# Patient Record
Sex: FEMALE | Race: WHITE | NOT HISPANIC OR LATINO | Employment: FULL TIME | URBAN - NONMETROPOLITAN AREA
[De-identification: names, ages, dates, MRNs, and addresses within clinical notes are randomized per-mention and may not be internally consistent; named-entity substitution may affect disease eponyms.]

---

## 2021-03-17 ENCOUNTER — OFFICE VISIT (OUTPATIENT)
Dept: FAMILY MEDICINE CLINIC | Facility: CLINIC | Age: 26
End: 2021-03-17

## 2021-03-17 ENCOUNTER — LAB (OUTPATIENT)
Dept: LAB | Facility: HOSPITAL | Age: 26
End: 2021-03-17

## 2021-03-17 VITALS
HEIGHT: 66 IN | SYSTOLIC BLOOD PRESSURE: 132 MMHG | DIASTOLIC BLOOD PRESSURE: 82 MMHG | BODY MASS INDEX: 47.09 KG/M2 | WEIGHT: 293 LBS

## 2021-03-17 DIAGNOSIS — E66.01 MORBID (SEVERE) OBESITY DUE TO EXCESS CALORIES (HCC): Primary | ICD-10-CM

## 2021-03-17 DIAGNOSIS — E88.81 METABOLIC SYNDROME: ICD-10-CM

## 2021-03-17 DIAGNOSIS — Z83.3 FAMILY HISTORY OF DIABETES MELLITUS (DM): ICD-10-CM

## 2021-03-17 DIAGNOSIS — Z11.59 NEED FOR HEPATITIS C SCREENING TEST: ICD-10-CM

## 2021-03-17 DIAGNOSIS — J45.40 MODERATE PERSISTENT ASTHMA, UNSPECIFIED WHETHER COMPLICATED: ICD-10-CM

## 2021-03-17 DIAGNOSIS — L68.0 HIRSUTISM: ICD-10-CM

## 2021-03-17 LAB
ALBUMIN SERPL-MCNC: 4.6 G/DL (ref 3.5–5.2)
ALBUMIN/GLOB SERPL: 1.4 G/DL
ALP SERPL-CCNC: 73 U/L (ref 39–117)
ALT SERPL W P-5'-P-CCNC: 40 U/L (ref 1–33)
ANION GAP SERPL CALCULATED.3IONS-SCNC: 10.5 MMOL/L (ref 5–15)
AST SERPL-CCNC: 32 U/L (ref 1–32)
BILIRUB SERPL-MCNC: 0.6 MG/DL (ref 0–1.2)
BUN SERPL-MCNC: 8 MG/DL (ref 6–20)
BUN/CREAT SERPL: 11.1 (ref 7–25)
CALCIUM SPEC-SCNC: 9.6 MG/DL (ref 8.6–10.5)
CHLORIDE SERPL-SCNC: 101 MMOL/L (ref 98–107)
CHOLEST SERPL-MCNC: 122 MG/DL (ref 0–200)
CO2 SERPL-SCNC: 28.5 MMOL/L (ref 22–29)
CREAT SERPL-MCNC: 0.72 MG/DL (ref 0.57–1)
DEPRECATED RDW RBC AUTO: 38.6 FL (ref 37–54)
ERYTHROCYTE [DISTWIDTH] IN BLOOD BY AUTOMATED COUNT: 12.6 % (ref 12.3–15.4)
GFR SERPL CREATININE-BSD FRML MDRD: 100 ML/MIN/1.73
GLOBULIN UR ELPH-MCNC: 3.3 GM/DL
GLUCOSE SERPL-MCNC: 106 MG/DL (ref 65–99)
HBA1C MFR BLD: 5.44 % (ref 4.8–5.6)
HCT VFR BLD AUTO: 46 % (ref 34–46.6)
HCV AB SER DONR QL: NORMAL
HDLC SERPL-MCNC: 55 MG/DL (ref 40–60)
HGB BLD-MCNC: 15.6 G/DL (ref 12–15.9)
LDLC SERPL CALC-MCNC: 49 MG/DL (ref 0–100)
LDLC/HDLC SERPL: 0.87 {RATIO}
MCH RBC QN AUTO: 28.8 PG (ref 26.6–33)
MCHC RBC AUTO-ENTMCNC: 33.9 G/DL (ref 31.5–35.7)
MCV RBC AUTO: 85 FL (ref 79–97)
PLATELET # BLD AUTO: 336 10*3/MM3 (ref 140–450)
PMV BLD AUTO: 11.2 FL (ref 6–12)
POTASSIUM SERPL-SCNC: 4.3 MMOL/L (ref 3.5–5.2)
PROT SERPL-MCNC: 7.9 G/DL (ref 6–8.5)
RBC # BLD AUTO: 5.41 10*6/MM3 (ref 3.77–5.28)
SODIUM SERPL-SCNC: 140 MMOL/L (ref 136–145)
T4 FREE SERPL-MCNC: 1.33 NG/DL (ref 0.93–1.7)
TRIGL SERPL-MCNC: 96 MG/DL (ref 0–150)
TSH SERPL DL<=0.05 MIU/L-ACNC: 1.25 UIU/ML (ref 0.27–4.2)
VLDLC SERPL-MCNC: 18 MG/DL (ref 5–40)
WBC # BLD AUTO: 9.29 10*3/MM3 (ref 3.4–10.8)

## 2021-03-17 PROCEDURE — 83036 HEMOGLOBIN GLYCOSYLATED A1C: CPT | Performed by: FAMILY MEDICINE

## 2021-03-17 PROCEDURE — 84439 ASSAY OF FREE THYROXINE: CPT | Performed by: FAMILY MEDICINE

## 2021-03-17 PROCEDURE — 36415 COLL VENOUS BLD VENIPUNCTURE: CPT | Performed by: FAMILY MEDICINE

## 2021-03-17 PROCEDURE — 80053 COMPREHEN METABOLIC PANEL: CPT | Performed by: FAMILY MEDICINE

## 2021-03-17 PROCEDURE — 85027 COMPLETE CBC AUTOMATED: CPT | Performed by: FAMILY MEDICINE

## 2021-03-17 PROCEDURE — 80061 LIPID PANEL: CPT | Performed by: FAMILY MEDICINE

## 2021-03-17 PROCEDURE — 99203 OFFICE O/P NEW LOW 30 MIN: CPT | Performed by: FAMILY MEDICINE

## 2021-03-17 PROCEDURE — 86803 HEPATITIS C AB TEST: CPT | Performed by: FAMILY MEDICINE

## 2021-03-17 PROCEDURE — 84402 ASSAY OF FREE TESTOSTERONE: CPT | Performed by: FAMILY MEDICINE

## 2021-03-17 PROCEDURE — 84403 ASSAY OF TOTAL TESTOSTERONE: CPT | Performed by: FAMILY MEDICINE

## 2021-03-17 PROCEDURE — 84443 ASSAY THYROID STIM HORMONE: CPT | Performed by: FAMILY MEDICINE

## 2021-03-17 RX ORDER — ALBUTEROL SULFATE 90 UG/1
2 AEROSOL, METERED RESPIRATORY (INHALATION) EVERY 4 HOURS PRN
Qty: 18 G | Refills: 11 | Status: SHIPPED | OUTPATIENT
Start: 2021-03-17 | End: 2022-01-03 | Stop reason: SDUPTHER

## 2021-03-17 NOTE — PROGRESS NOTES
Subjective   Justiceradha Rey is a 26 y.o. female.  Requesting primary care evaluation.  Carries diagnoses of persistent mild asthma is had for many years including childhood.  Takes long-acting and the rescue inhaler.  Does not use rescue that often.  Has not had influenza vaccine or Covid vaccine.  Also is concerned about weight.  Also had some hirsutism around the face probable has a metabolic syndrome as well as probable PCO.  Did visit with gynecology last year.  Some family history of diabetes.  Is not known to snore.  History and sidebar regenerated.    History of Present Illness   HPI    The following portions of the patient's history were reviewed and updated as appropriate: allergies, current medications, past family history, past medical history, past social history, past surgical history and problem list.    Review of Systems  Review of Systems   Constitutional: Negative for activity change, appetite change, fatigue and unexpected weight change.   HENT: Negative for trouble swallowing and voice change.    Eyes: Negative for redness and visual disturbance.   Respiratory: Positive for shortness of breath (Treated). Negative for cough and wheezing.    Cardiovascular: Negative for chest pain and palpitations.   Gastrointestinal: Negative for abdominal pain, constipation, diarrhea, nausea and vomiting.   Genitourinary: Negative for urgency.   Musculoskeletal: Negative for joint swelling.   Neurological: Negative for syncope and headaches.   Hematological: Negative for adenopathy.   Psychiatric/Behavioral: Negative for sleep disturbance.       Objective   Physical Exam  Physical Exam  Constitutional:       Appearance: She is well-developed.   HENT:      Head: Normocephalic.   Eyes:      Pupils: Pupils are equal, round, and reactive to light.   Neck:      Thyroid: No thyromegaly.   Cardiovascular:      Rate and Rhythm: Normal rate and regular rhythm.      Heart sounds: Normal heart sounds. No murmur heard.   No  "friction rub. No gallop.    Pulmonary:      Breath sounds: Normal breath sounds.   Abdominal:      General: There is no distension.      Palpations: Abdomen is soft. There is no mass.      Tenderness: There is no abdominal tenderness.   Musculoskeletal:         General: Normal range of motion.      Cervical back: Normal range of motion.   Skin:     General: Skin is warm and dry.      Comments: Mild terminal hair hirsutism under the chin and jawline.  No other terminal hair growth.   Neurological:      Mental Status: She is alert and oriented to person, place, and time.      Deep Tendon Reflexes: Reflexes are normal and symmetric.   Psychiatric:         Attention and Perception: Attention normal.         Mood and Affect: Mood normal.         Speech: Speech normal.         Behavior: Behavior normal.         Thought Content: Thought content normal.         Cognition and Memory: Cognition normal.           Visit Vitals  /82   Ht 167.6 cm (66\")   Wt (!) 143 kg (316 lb)   LMP 02/22/2021 (Exact Date)   BMI 51.00 kg/m²     Body mass index is 51 kg/m².      Assessment/Plan   Diagnoses and all orders for this visit:    1. Morbid (severe) obesity due to excess calories (CMS/HCC) (Primary)  -     CBC (No Diff)  -     Comprehensive Metabolic Panel  -     Hemoglobin A1c  -     T4, Free  -     TSH    2. Hirsutism  -     T4, Free  -     TSH  -     Testosterone (Free & Total), LC / MS    3. Family history of diabetes mellitus (DM)  -     Hemoglobin A1c    4. Moderate persistent asthma, unspecified whether complicated  -     albuterol sulfate  (90 Base) MCG/ACT inhaler; Inhale 2 puffs Every 4 (Four) Hours As Needed for Wheezing or Shortness of Air.  Dispense: 18 g; Refill: 11  -     fluticasone-salmeterol (ADVAIR) 100-50 MCG/DOSE DISKUS; Inhale 1 puff 2 (Two) Times a Day.  Dispense: 120 each; Refill: 3    5. Need for hepatitis C screening test  -     Hepatitis C Antibody    6. Metabolic syndrome  -     Hemoglobin A1c  -  "    T4, Free  -     TSH  -     Lipid Panel With LDL / HDL Ratio    Willie asthma counseled on updating immunizations yearly influenza Covid vaccine signed up counseled on use of medication properly avoidance measures etc.   need for metabolic screening.  Suspect does have an element of same.  Orders as above.  We will follow-up based on same to see what else needs to be done.  Check 5 to 7 days.

## 2021-03-23 LAB
TESTOST FREE SERPL-MCNC: 4.9 PG/ML (ref 0–4.2)
TESTOST SERPL-MCNC: 68 NG/DL (ref 10–55)

## 2021-03-24 ENCOUNTER — OFFICE VISIT (OUTPATIENT)
Dept: FAMILY MEDICINE CLINIC | Facility: CLINIC | Age: 26
End: 2021-03-24

## 2021-03-24 VITALS
WEIGHT: 293 LBS | HEIGHT: 66 IN | DIASTOLIC BLOOD PRESSURE: 84 MMHG | SYSTOLIC BLOOD PRESSURE: 126 MMHG | BODY MASS INDEX: 47.09 KG/M2

## 2021-03-24 DIAGNOSIS — E66.01 MORBID (SEVERE) OBESITY DUE TO EXCESS CALORIES (HCC): Primary | Chronic | ICD-10-CM

## 2021-03-24 DIAGNOSIS — J45.40 MODERATE PERSISTENT ASTHMA, UNSPECIFIED WHETHER COMPLICATED: Chronic | ICD-10-CM

## 2021-03-24 DIAGNOSIS — E34.9 FEMALE HYPERTESTOSTERONEMIA: ICD-10-CM

## 2021-03-24 PROCEDURE — 99213 OFFICE O/P EST LOW 20 MIN: CPT | Performed by: FAMILY MEDICINE

## 2021-03-29 ENCOUNTER — IMMUNIZATION (OUTPATIENT)
Dept: VACCINE CLINIC | Facility: HOSPITAL | Age: 26
End: 2021-03-29

## 2021-03-29 PROCEDURE — 0001A: CPT | Performed by: NURSE PRACTITIONER

## 2021-03-29 PROCEDURE — 91300 HC SARSCOV02 VAC 30MCG/0.3ML IM: CPT | Performed by: NURSE PRACTITIONER

## 2021-04-01 ENCOUNTER — TELEPHONE (OUTPATIENT)
Dept: FAMILY MEDICINE CLINIC | Facility: CLINIC | Age: 26
End: 2021-04-01

## 2021-04-01 NOTE — TELEPHONE ENCOUNTER
Pt will be attending college in [place] and is asking for her referral to endocrinology be to a [facility] provider located in [place].    Call pt for questions or next steps.    [phone]

## 2021-04-19 ENCOUNTER — IMMUNIZATION (OUTPATIENT)
Dept: VACCINE CLINIC | Facility: HOSPITAL | Age: 26
End: 2021-04-19

## 2021-04-19 PROCEDURE — 91300 HC SARSCOV02 VAC 30MCG/0.3ML IM: CPT | Performed by: THORACIC SURGERY (CARDIOTHORACIC VASCULAR SURGERY)

## 2021-04-19 PROCEDURE — 0002A: CPT | Performed by: THORACIC SURGERY (CARDIOTHORACIC VASCULAR SURGERY)

## 2021-04-24 ENCOUNTER — HOSPITAL ENCOUNTER (EMERGENCY)
Facility: HOSPITAL | Age: 26
Discharge: HOME | End: 2021-04-24
Attending: STUDENT IN AN ORGANIZED HEALTH CARE EDUCATION/TRAINING PROGRAM | Admitting: EMERGENCY MEDICINE

## 2021-04-24 VITALS
BODY MASS INDEX: 47.09 KG/M2 | HEIGHT: 66 IN | WEIGHT: 293 LBS | DIASTOLIC BLOOD PRESSURE: 97 MMHG | TEMPERATURE: 98.2 F | OXYGEN SATURATION: 100 % | RESPIRATION RATE: 20 BRPM | HEART RATE: 66 BPM | SYSTOLIC BLOOD PRESSURE: 158 MMHG

## 2021-04-24 DIAGNOSIS — S01.81XA LACERATION OF FOREHEAD, INITIAL ENCOUNTER: Primary | ICD-10-CM

## 2021-04-24 PROCEDURE — 25010000002 TDAP 5-2.5-18.5 LF-MCG/0.5 SUSPENSION: Performed by: PHYSICIAN ASSISTANT

## 2021-04-24 PROCEDURE — 90471 IMMUNIZATION ADMIN: CPT | Performed by: PHYSICIAN ASSISTANT

## 2021-04-24 PROCEDURE — 90715 TDAP VACCINE 7 YRS/> IM: CPT | Performed by: PHYSICIAN ASSISTANT

## 2021-04-24 PROCEDURE — 99282 EMERGENCY DEPT VISIT SF MDM: CPT

## 2021-04-24 RX ORDER — LIDOCAINE HYDROCHLORIDE 10 MG/ML
10 INJECTION, SOLUTION EPIDURAL; INFILTRATION; INTRACAUDAL; PERINEURAL ONCE
Status: COMPLETED | OUTPATIENT
Start: 2021-04-24 | End: 2021-04-24

## 2021-04-24 RX ADMIN — LIDOCAINE HYDROCHLORIDE 10 ML: 10 INJECTION, SOLUTION EPIDURAL; INFILTRATION; INTRACAUDAL; PERINEURAL at 19:31

## 2021-04-24 RX ADMIN — TETANUS TOXOID, REDUCED DIPHTHERIA TOXOID AND ACELLULAR PERTUSSIS VACCINE, ADSORBED 0.5 ML: 5; 2.5; 8; 8; 2.5 SUSPENSION INTRAMUSCULAR at 19:29

## 2021-04-25 NOTE — ED PROVIDER NOTES
Subjective   Patient presents to emergency department for laceration to left eyebrow/forehead.  States she fell through some wood decking and hit her forehead on a metal chair.  She is unsure if she passed out but states it was <2sec and no Syncope was noted although it was witnessed by several bystanders.  She is unsure of tetanus status.  She denies any other injury.      History provided by:  Patient   used: No        Review of Systems   Constitutional: Negative for chills and fever.   HENT: Negative for sore throat and trouble swallowing.    Eyes: Negative for visual disturbance.   Respiratory: Negative for shortness of breath and wheezing.    Cardiovascular: Negative for chest pain.   Gastrointestinal: Negative for abdominal pain, nausea and vomiting.   Skin: Positive for wound.   Allergic/Immunologic: Negative for immunocompromised state.   Neurological: Negative for syncope, weakness, numbness and headaches.   Hematological: Does not bruise/bleed easily.   Psychiatric/Behavioral: Negative for confusion.       Past Medical History:   Diagnosis Date   • Allergic rhinitis    • Ankle pain    • Asthma     IgE-mediated allergic asthma      • Body mass index 40.0-44.9, adult (CMS/[facility])     BMI 42.5   • Bronchitis    • Chronic fatigue, unspecified    • Encounter for screening for diabetes mellitus    • Hematoma     is traumatic   • Insomnia, unspecified    • Pain in lower limb    • Rhinitis    • Ruptured varicose vein     RLE   • Sprain of ankle    • Upper respiratory infection        No Known Allergies    Past Surgical History:   Procedure Laterality Date   • INJECTION OF MEDICATION  05/01/2015    Depo Medrol (Methylprednisone) (2)      • INJECTION OF MEDICATION  01/01/2016    INFLUENZA IMMUN ADMIN OR PREV RECV'D  (1)      • INJECTION OF MEDICATION  06/28/2016    Kenalog (1)      • TONSILLECTOMY         Family History   Problem Relation Age of Onset   • Diabetes Other        Social History  "    Socioeconomic History   • Marital status: Single     Spouse name: Not on file   • Number of children: Not on file   • Years of education: Not on file   • Highest education level: Not on file   Tobacco Use   • Smoking status: Never Smoker   Substance and Sexual Activity   • Alcohol use: Yes     Comment: occasional   • Drug use: Never   • Sexual activity: Never           Objective      /97 (BP Location: Right arm, Patient Position: Sitting)   Pulse 66   Temp 98.2 °F (36.8 °C) (Infrared)   Resp 20   Ht 167.6 cm (66\")   Wt (!) 150 kg (330 lb 6.4 oz)   LMP  (LMP Unknown)   SpO2 100%   Breastfeeding No   BMI 53.33 kg/m²     Physical Exam  Vitals and nursing note reviewed.   Constitutional:       Appearance: Normal appearance. She is well-developed.   HENT:      Head:      Comments: 2 cm vertical laceration to medial left eyebrow/forehead.  No deformity or underlying crepitus.  Mild swelling around laceration.     Nose: Nose normal.      Mouth/Throat:      Mouth: Mucous membranes are moist.   Eyes:      Conjunctiva/sclera: Conjunctivae normal.   Cardiovascular:      Rate and Rhythm: Normal rate and regular rhythm.      Heart sounds: Normal heart sounds.   Pulmonary:      Effort: Pulmonary effort is normal. No respiratory distress.      Breath sounds: Normal breath sounds. No wheezing.   Skin:     General: Skin is warm and dry.      Capillary Refill: Capillary refill takes less than 2 seconds.   Neurological:      Mental Status: She is alert and oriented to person, place, and time.   Psychiatric:         Behavior: Behavior normal.         Thought Content: Thought content normal.         Laceration Repair    Date/Time: 4/24/2021 9:06 PM  Performed by: Yann Crawford PA-C  Authorized by: Dionte Peralta MD     Consent:     Consent obtained:  Verbal    Consent given by:  Patient    Risks discussed:  Infection, pain, retained foreign body, need for additional repair, poor cosmetic result, tendon " damage, vascular damage, poor wound healing and nerve damage    Alternatives discussed:  No treatment  Anesthesia (see MAR for exact dosages):     Anesthesia method:  Local infiltration    Local anesthetic:  Lidocaine 1% w/o epi  Laceration details:     Location:  Face    Face location:  L eyebrow    Length (cm):  2  Repair type:     Repair type:  Simple  Pre-procedure details:     Preparation:  Patient was prepped and draped in usual sterile fashion  Exploration:     Hemostasis achieved with:  Cautery    Wound exploration: wound explored through full range of motion and entire depth of wound probed and visualized      Wound extent: no foreign bodies/material noted and no underlying fracture noted      Contaminated: no    Treatment:     Area cleansed with:  Hibiclens and saline    Amount of cleaning:  Standard    Irrigation solution:  Sterile water  Skin repair:     Repair method:  Sutures    Suture size:  5-0    Suture material:  Nylon    Suture technique:  Simple interrupted    Number of sutures:  4  Approximation:     Approximation:  Close  Post-procedure details:     Dressing:  Open (no dressing)    Patient tolerance of procedure:  Tolerated well, no immediate complications               ED Course           Discussed results with patient.  Gave educational materials.  Advised close follow up with PCP in 1 week for wound check/suture removal.  Return to emergency department for new or worsening symptoms.                                    MDM    Final diagnoses:   Laceration of forehead, initial encounter       ED Disposition  ED Disposition     ED Disposition Condition Comment    Discharge Stable           Inder Ignacio MD  [facility] [address] [place] [address] [phone]    Call   for wound check/suture removal in 1 week.    [facility] Emergency Department  [address]  [facility] [address] [phone]  Go to   if symptoms worsen.         Medication List      No changes  were made to your prescriptions during this visit.          Yann Crawford PA-C  04/24/21 2992

## 2021-04-30 ENCOUNTER — OFFICE VISIT (OUTPATIENT)
Dept: FAMILY MEDICINE CLINIC | Facility: CLINIC | Age: 26
End: 2021-04-30

## 2021-04-30 VITALS
HEIGHT: 66 IN | BODY MASS INDEX: 47.09 KG/M2 | WEIGHT: 293 LBS | SYSTOLIC BLOOD PRESSURE: 132 MMHG | DIASTOLIC BLOOD PRESSURE: 82 MMHG

## 2021-04-30 DIAGNOSIS — S01.80XD OPEN WOUND OF FACE, SUBSEQUENT ENCOUNTER: Primary | ICD-10-CM

## 2021-04-30 DIAGNOSIS — Z48.02 VISIT FOR SUTURE REMOVAL: ICD-10-CM

## 2021-04-30 DIAGNOSIS — J45.40 MODERATE PERSISTENT ASTHMA, UNSPECIFIED WHETHER COMPLICATED: ICD-10-CM

## 2021-04-30 PROCEDURE — 99212 OFFICE O/P EST SF 10 MIN: CPT | Performed by: FAMILY MEDICINE

## 2021-04-30 NOTE — PROGRESS NOTES
Answers for HPI/ROS submitted by the patient on 4/27/2021  Please describe your symptoms.: Need to have follow up after facial laceration and stitches removed.  Have you had these symptoms before?: No  How long have you been having these symptoms?: 1-2 weeks  What is the primary reason for your visit?: Other    Subjective   Justice Jovita Rey is a 26 y.o. female.  Emergency room follow-up trauma laceration left upper medial brow abrasion to the nose.  Fell through a porch.  Now about 6 days out.  Stitches need removed left brow.  Still having some occasional nose discomfort overall improved.  Also requesting increase in Advair to the 500 mcg dose.  States works better for her asthma during summertime.    History of Present Illness   HPI    The following portions of the patient's history were reviewed and updated as appropriate: allergies, current medications, past family history, past medical history, past social history, past surgical history and problem list.    Review of Systems  Review of Systems   Constitutional: Negative for activity change, appetite change, fatigue and unexpected weight change.   HENT: Negative for trouble swallowing and voice change.    Eyes: Negative for redness and visual disturbance.   Respiratory: Positive for wheezing (Controlled asthma). Negative for cough.    Cardiovascular: Negative for chest pain and palpitations.   Gastrointestinal: Negative for abdominal pain, constipation, diarrhea, nausea and vomiting.   Genitourinary: Negative for urgency.   Musculoskeletal: Negative for joint swelling.   Skin: Positive for wound.   Neurological: Negative for syncope and headaches.   Hematological: Negative for adenopathy.   Psychiatric/Behavioral: Negative for sleep disturbance.       Objective   Physical Exam  Physical Exam  Constitutional:       Appearance: She is obese.   HENT:      Head: Normocephalic.   Cardiovascular:      Rate and Rhythm: Normal rate.      Pulses: Normal pulses.   Pulmonary:  "     Effort: Pulmonary effort is normal.   Abdominal:      General: Abdomen is flat.   Skin:     Comments: Left upper inner brow shows a vertical laceration about 2 cm or so.  4 of 6-0 nylon sutures are in place.  These are removed middle part of the brow may start to gap little bit this is closed using up Steri-Strips.  Counseled on wound care.           Visit Vitals  /82   Ht 167.6 cm (66\")   Wt (!) 147 kg (323 lb)   LMP  (LMP Unknown)   BMI 52.13 kg/m²     Body mass index is 52.13 kg/m².      Assessment/Plan   Diagnoses and all orders for this visit:    1. Open wound of face, subsequent encounter (Primary)    2. Moderate persistent asthma, unspecified whether complicated  -     fluticasone-salmeterol (Advair Diskus) 500-50 MCG/DOSE DISKUS; Inhale 1 puff 2 (Two) Times a Day.  Dispense: 180 each; Refill: 3    3. Visit for suture removal    Counseled on wound care letting Steri-Strips fall off naturally routine skin care routine nose care increase medication rechecks directed  "

## 2021-10-26 ENCOUNTER — LAB (OUTPATIENT)
Dept: LAB | Facility: HOSPITAL | Age: 26
End: 2021-10-26

## 2021-10-26 ENCOUNTER — OFFICE VISIT (OUTPATIENT)
Dept: ENDOCRINOLOGY | Facility: CLINIC | Age: 26
End: 2021-10-26

## 2021-10-26 ENCOUNTER — MEDICATION THERAPY MANAGEMENT (OUTPATIENT)
Dept: PHARMACY | Facility: HOSPITAL | Age: 26
End: 2021-10-26

## 2021-10-26 VITALS
BODY MASS INDEX: 47.09 KG/M2 | OXYGEN SATURATION: 98 % | HEART RATE: 109 BPM | SYSTOLIC BLOOD PRESSURE: 110 MMHG | DIASTOLIC BLOOD PRESSURE: 60 MMHG | WEIGHT: 293 LBS | HEIGHT: 66 IN

## 2021-10-26 DIAGNOSIS — E16.1 HYPERINSULINEMIA: ICD-10-CM

## 2021-10-26 DIAGNOSIS — E34.9 FEMALE HYPERTESTOSTERONEMIA: Primary | ICD-10-CM

## 2021-10-26 PROCEDURE — 99204 OFFICE O/P NEW MOD 45 MIN: CPT | Performed by: INTERNAL MEDICINE

## 2021-10-26 PROCEDURE — 82397 CHEMILUMINESCENT ASSAY: CPT | Performed by: INTERNAL MEDICINE

## 2021-10-26 PROCEDURE — 36415 COLL VENOUS BLD VENIPUNCTURE: CPT | Performed by: INTERNAL MEDICINE

## 2021-10-26 PROCEDURE — 85025 COMPLETE CBC W/AUTO DIFF WBC: CPT | Performed by: INTERNAL MEDICINE

## 2021-10-26 PROCEDURE — 83498 ASY HYDROXYPROGESTERONE 17-D: CPT | Performed by: INTERNAL MEDICINE

## 2021-10-26 PROCEDURE — 82627 DEHYDROEPIANDROSTERONE: CPT | Performed by: INTERNAL MEDICINE

## 2021-10-26 PROCEDURE — 84703 CHORIONIC GONADOTROPIN ASSAY: CPT | Performed by: INTERNAL MEDICINE

## 2021-10-26 PROCEDURE — 80053 COMPREHEN METABOLIC PANEL: CPT | Performed by: INTERNAL MEDICINE

## 2021-10-26 PROCEDURE — 84403 ASSAY OF TOTAL TESTOSTERONE: CPT | Performed by: INTERNAL MEDICINE

## 2021-10-26 RX ORDER — METFORMIN HYDROCHLORIDE 500 MG/1
1000 TABLET, EXTENDED RELEASE ORAL 2 TIMES DAILY WITH MEALS
Qty: 120 TABLET | Refills: 11 | Status: SHIPPED | OUTPATIENT
Start: 2021-10-26 | End: 2022-09-30

## 2021-10-26 RX ORDER — ONDANSETRON 4 MG/1
4 TABLET, ORALLY DISINTEGRATING ORAL EVERY 8 HOURS PRN
Qty: 45 TABLET | Refills: 11 | Status: SHIPPED | OUTPATIENT
Start: 2021-10-26

## 2021-10-26 RX ORDER — SEMAGLUTIDE 1.34 MG/ML
0.5 INJECTION, SOLUTION SUBCUTANEOUS WEEKLY
Qty: 1.5 ML | Refills: 11 | Status: SHIPPED | OUTPATIENT
Start: 2021-10-26 | End: 2022-04-07

## 2021-10-26 NOTE — PROGRESS NOTES
[facility] Pharmacy Visit - Endocrinology    Justice Rey is a 26 y.o. female seen by Endocrinology and assessed by the [facility] Pharmacist.     Justice Rey was introduced to the [facility] and allergies/PMH/medications were reviewed.       Past Medical History:   Diagnosis Date   • Allergic rhinitis    • Ankle pain    • Asthma     IgE-mediated allergic asthma      • Body mass index 40.0-44.9, adult (HCC)     BMI 42.5   • Bronchitis    • Chronic fatigue, unspecified    • Encounter for screening for diabetes mellitus    • Hematoma     is traumatic   • Insomnia, unspecified    • Pain in lower limb    • Rhinitis    • Ruptured varicose vein     RLE   • Sprain of ankle    • Upper respiratory infection      Social History     Socioeconomic History   • Marital status: Single   Tobacco Use   • Smoking status: Current Some Day Smoker   • Smokeless tobacco: Never Used   Vaping Use   • Vaping Use: Never used   Substance and Sexual Activity   • Alcohol use: Yes     Comment: occasional   • Drug use: Never   • Sexual activity: Never       Medication Allergies:  Patient has no known allergies.      Current Outpatient Medications:   •  albuterol sulfate  (90 Base) MCG/ACT inhaler, Inhale 2 puffs Every 4 (Four) Hours As Needed for Wheezing or Shortness of Air., Disp: 18 g, Rfl: 11  •  fluticasone-salmeterol (Advair Diskus) 500-50 MCG/DOSE DISKUS, Inhale 1 puff 2 (Two) Times a Day., Disp: 180 each, Rfl: 3  •  metFORMIN ER (Glucophage XR) 500 MG 24 hr tablet, Take 2 tablets by mouth 2 (Two) Times a Day With Meals., Disp: 120 tablet, Rfl: 11  •  ondansetron ODT (ZOFRAN-ODT) 4 MG disintegrating tablet, Dissolve 1 tablet on the tongue Every 8 (Eight) Hours As Needed for Nausea or Vomiting., Disp: 45 tablet, Rfl: 11  •  Semaglutide,0.25 or 0.5MG/DOS, (Ozempic, 0.25 or 0.5 MG/DOSE,) 2 MG/1.5ML solution pen-injector, Inject 0.5 mg under the skin into the appropriate area as directed  1 (One) Time Per Week., Disp: 1.5 mL, Rfl: 11    Labs:  There were no vitals filed for this visit.  There were no vitals filed for this visit.  Lab Results   Component Value Date    HGBA1C 5.44 03/17/2021     Lab Results   Component Value Date    GLUCOSE 106 (H) 03/17/2021    CALCIUM 9.6 03/17/2021     03/17/2021    K 4.3 03/17/2021    CO2 28.5 03/17/2021     03/17/2021    BUN 8 03/17/2021    CREATININE 0.72 03/17/2021    EGFRIFNONA 100 03/17/2021    BCR 11.1 03/17/2021    ANIONGAP 10.5 03/17/2021     Lab Results   Component Value Date    CHOL 122 03/17/2021    TRIG 96 03/17/2021    HDL 55 03/17/2021    LDL 49 03/17/2021       Visit Summary:  Patient is new to ozempic and metformin. Patient is from our area but lives in [place] for school right now. We will follow her refills closely to help coordinate.     Medication Education on Specialty Medication:  Ozempic (semaglutide)   • Discussed the medication's MOA and that it helps you feel full, helps your body release more insulin and helps your body make less sugar after meals.  • Administer by subcutaneous injection into the abdomen, thigh, or upper arm on the same day each week without regard to food. Rotate injection sites weekly if injecting in the same area of the body and use a new needle every time Ozempic is used. Do not mix with other products.   o For each new prefilled pen, prime the needle before the first injection by turning the dose selector to the flow check symbol and injecting into the air (priming is not required for subsequent injections). Once injected, continue to depress the button until the dial has returned to 0 and for an additional 6 seconds. Then, remove the needle and discard in sharps container.  • Unopened pens should be stored in refrigerator, opened pens may be stored in refrigerator or at room temperature for up to 56 days  • If you miss a dose, take it as soon as you remember and then get back on schedule  o If it has  been > 5 days, skip that dose and get back on your regular schedule allowing at least 48 hours between 2 doses.   o Never double up doses to make up for a missed dose  • Nausea, vomiting, and diarrhea are most common when first starting Ozempic, but they should decrease over time  • Make sure to eat smaller meals throughout the day versus larger meals and this should help with GI symptoms as well.        Plan:  1. The patient was provided medication education via verbal and written information. Patient expressed understanding and had no further questions at this time.  2. Recommendations: none at this time  3. Discussed the [facility] pharmacy services that are available including monitoring of refills, prior authorizations, and copay coupon cards, as applicable, as well as mail-order as needed.  4. Care Coordinator, Alexis Oneill, will assist with the transfer of prescriptions as applicable.  5. Contact information for the pharmacy team was provided to the patient.      Garfield Centeno, PharmYUSUF  10/26/2021  08:47 CDT

## 2021-10-26 NOTE — PROGRESS NOTES
"Chief Complaint   Patient presents with   • Hypertestosteronemia         History of Present Illness    26 y.o. female w elevated testosterone    Symptoms : Oligomenorrhea, Hirsutism, Weight gain     Location of Hirsutism : Chin , chest , abdomen     No acne, no balding, no voice deepening     Duration of symptoms : since age 24     Treatment - progestin only contraceptive         ==========================================  Physical Exam  /60   Pulse 109   Ht 167.6 cm (66\")   Wt (!) 150 kg (330 lb)   SpO2 98%   BMI 53.26 kg/m²   AOx3  No Goiter , no carotid bruit  RRR  CTA  No Edema     ==========================================    Laboratory Workup    Lab Results   Component Value Date    WBC 8.23 10/26/2021    HGB 15.4 10/26/2021    HCT 45.1 10/26/2021    MCV 87.1 10/26/2021     10/26/2021       Lab Results   Component Value Date    GLUCOSE 100 (H) 10/26/2021    BUN 8 10/26/2021    CREATININE 0.72 10/26/2021    EGFRIFNONA 100 10/26/2021    BCR 11.1 10/26/2021    K 4.5 10/26/2021    CO2 27.2 10/26/2021    CALCIUM 9.3 10/26/2021    ALBUMIN 4.60 10/26/2021    AST 21 10/26/2021    ALT 38 (H) 10/26/2021     Component      Latest Ref Rng & Units 10/26/2021   17-Hydroxyprogesterone      ng/dL 21   Anti-Mullerian Hormone (AMH)      ng/mL 2.14   Testosterone, Total      8.40 - 48.10 ng/dL 43.40   HCG Qualitative      Negative Negative   DHEA-Sulfate      110.0 - 431.7 ug/dL 203.0         ==========================================      ICD-10-CM ICD-9-CM   1. 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 Hyperinsulinemia  E16.1 251.1   -    Most consistent w PCOS    Ruled out CAH  Pending saliva cortisol     Start metformin and ozempic    She is on progesting only ocp and having a cycle at least every 3months  She will establish w gynecologist     No aldactone for now          Orders Placed This Encounter   Procedures   • 17-Hydroxyprogesterone     Order Specific Question:   Release to patient     Answer:   " Immediate   • Antimullerian Hormone (AMH)     Order Specific Question:   Release to patient     Answer:   Immediate   • Testosterone     Order Specific Question:   Release to patient     Answer:   Immediate   • hCG, Serum, Qualitative     Order Specific Question:   Release to patient     Answer:   Immediate   • Salivary Cortisol X4, Timed - Saliva, Oral Cavity     Scheduling Instructions:      Collect at 8 a.m. And 11 p.m. On 2 consecutive days     Order Specific Question:   Release to patient     Answer:   Immediate   • DHEA-Sulfate     Order Specific Question:   Release to patient     Answer:   Immediate   • Comprehensive Metabolic Panel     Order Specific Question:   Release to patient     Answer:   Immediate   • CBC Auto Differential   • CBC & Differential     Order Specific Question:   Manual Differential     Answer:   No                This document has been electronically signed by Jordi Edwards MD on November 5, 2021 18:16 CDT

## 2021-10-27 LAB
ALBUMIN SERPL-MCNC: 4.6 G/DL (ref 3.5–5.2)
ALBUMIN/GLOB SERPL: 1.4 G/DL
ALP SERPL-CCNC: 85 U/L (ref 39–117)
ALT SERPL W P-5'-P-CCNC: 38 U/L (ref 1–33)
ANION GAP SERPL CALCULATED.3IONS-SCNC: 9.8 MMOL/L (ref 5–15)
AST SERPL-CCNC: 21 U/L (ref 1–32)
BASOPHILS # BLD AUTO: 0.06 10*3/MM3 (ref 0–0.2)
BASOPHILS NFR BLD AUTO: 0.7 % (ref 0–1.5)
BILIRUB SERPL-MCNC: 0.6 MG/DL (ref 0–1.2)
BUN SERPL-MCNC: 8 MG/DL (ref 6–20)
BUN/CREAT SERPL: 11.1 (ref 7–25)
CALCIUM SPEC-SCNC: 9.3 MG/DL (ref 8.6–10.5)
CHLORIDE SERPL-SCNC: 101 MMOL/L (ref 98–107)
CO2 SERPL-SCNC: 27.2 MMOL/L (ref 22–29)
CREAT SERPL-MCNC: 0.72 MG/DL (ref 0.57–1)
DEPRECATED RDW RBC AUTO: 39.2 FL (ref 37–54)
DHEA-S SERPL-MCNC: 203 UG/DL (ref 110–431.7)
EOSINOPHIL # BLD AUTO: 0.18 10*3/MM3 (ref 0–0.4)
EOSINOPHIL NFR BLD AUTO: 2.2 % (ref 0.3–6.2)
ERYTHROCYTE [DISTWIDTH] IN BLOOD BY AUTOMATED COUNT: 12.4 % (ref 12.3–15.4)
GFR SERPL CREATININE-BSD FRML MDRD: 100 ML/MIN/1.73
GLOBULIN UR ELPH-MCNC: 3.2 GM/DL
GLUCOSE SERPL-MCNC: 100 MG/DL (ref 65–99)
HCG SERPL QL: NEGATIVE
HCT VFR BLD AUTO: 45.1 % (ref 34–46.6)
HGB BLD-MCNC: 15.4 G/DL (ref 12–15.9)
IMM GRANULOCYTES # BLD AUTO: 0.03 10*3/MM3 (ref 0–0.05)
IMM GRANULOCYTES NFR BLD AUTO: 0.4 % (ref 0–0.5)
LYMPHOCYTES # BLD AUTO: 2.12 10*3/MM3 (ref 0.7–3.1)
LYMPHOCYTES NFR BLD AUTO: 25.8 % (ref 19.6–45.3)
MCH RBC QN AUTO: 29.7 PG (ref 26.6–33)
MCHC RBC AUTO-ENTMCNC: 34.1 G/DL (ref 31.5–35.7)
MCV RBC AUTO: 87.1 FL (ref 79–97)
MONOCYTES # BLD AUTO: 0.6 10*3/MM3 (ref 0.1–0.9)
MONOCYTES NFR BLD AUTO: 7.3 % (ref 5–12)
NEUTROPHILS NFR BLD AUTO: 5.24 10*3/MM3 (ref 1.7–7)
NEUTROPHILS NFR BLD AUTO: 63.6 % (ref 42.7–76)
NRBC BLD AUTO-RTO: 0 /100 WBC (ref 0–0.2)
PLATELET # BLD AUTO: 338 10*3/MM3 (ref 140–450)
PMV BLD AUTO: 10.9 FL (ref 6–12)
POTASSIUM SERPL-SCNC: 4.5 MMOL/L (ref 3.5–5.2)
PROT SERPL-MCNC: 7.8 G/DL (ref 6–8.5)
RBC # BLD AUTO: 5.18 10*6/MM3 (ref 3.77–5.28)
SODIUM SERPL-SCNC: 138 MMOL/L (ref 136–145)
TESTOST SERPL-MCNC: 43.4 NG/DL (ref 8.4–48.1)
WBC # BLD AUTO: 8.23 10*3/MM3 (ref 3.4–10.8)

## 2021-10-30 ENCOUNTER — LAB (OUTPATIENT)
Dept: LAB | Facility: HOSPITAL | Age: 26
End: 2021-10-30

## 2021-10-30 PROCEDURE — 82533 TOTAL CORTISOL: CPT | Performed by: INTERNAL MEDICINE

## 2021-10-31 LAB — 17OHP SERPL-MCNC: 21 NG/DL

## 2021-11-01 LAB — MIS SERPL-MCNC: 2.14 NG/ML

## 2021-11-06 LAB
CORTIS BS SAL-MCNC: 0.02 UG/DL
CORTIS SP1 P CHAL SAL-MCNC: 0.2 UG/DL
CORTIS SP2 P CHAL SAL-MCNC: 0.1 UG/DL
CORTIS SP3 P CHAL SAL-MCNC: 0.4 UG/DL

## 2021-11-08 ENCOUNTER — SPECIALTY PHARMACY (OUTPATIENT)
Dept: PHARMACY | Facility: HOSPITAL | Age: 26
End: 2021-11-08

## 2021-11-08 RX ORDER — ACETAMINOPHEN AND CODEINE PHOSPHATE 120; 12 MG/5ML; MG/5ML
SOLUTION ORAL
COMMUNITY
Start: 2021-10-20 | End: 2021-12-30

## 2021-11-08 RX ORDER — LEVONORGESTREL 1.5 MG/1
TABLET ORAL
COMMUNITY
Start: 2021-08-27 | End: 2021-12-30

## 2021-11-29 ENCOUNTER — SPECIALTY PHARMACY (OUTPATIENT)
Dept: ENDOCRINOLOGY | Facility: CLINIC | Age: 26
End: 2021-11-29

## 2021-11-29 NOTE — PROGRESS NOTES
I have added a refill coordination for next month. She did express some nausea with the ozempic and I told her to please let me know if that continues.

## 2021-12-20 ENCOUNTER — SPECIALTY PHARMACY (OUTPATIENT)
Dept: ENDOCRINOLOGY | Facility: CLINIC | Age: 26
End: 2021-12-20

## 2021-12-20 NOTE — PROGRESS NOTES
Specialty Pharmacy Refill Coordination Note     Justice is a 26 y.o. female contacted today regarding refills of  Ozempic specialty medication(s).    Reviewed and verified with patient:  Allergies  Meds       Specialty medication(s) and dose(s) confirmed: yes    Refill Questions      Most Recent Value   Changes to allergies? No   Changes to medications? No   New conditions since last clinic visit No   Unplanned office visit, urgent care, ED, or hospital admission in the last 4 weeks  No   How does patient/caregiver feel medication is working? Very good   Financial problems or insurance changes  No   How many doses of your specialty medications were missed in the last 4 weeks? 0          Delivery Questions      Most Recent Value   Delivery method  at Pharmacy   Delivery phone number [phone]   Medication being filled and delivered Ozempic, metformin and possible zofran   Doses left of specialty medications 1 week   Is there any medication that is due not being filled? No   Supplies needed? No supplies needed   Copay form of payment Pay at pickup   Questions or concerns for the pharmacist? No   Are any medications first time fills? No        Ms. Rey is not having any issues, she will be in town for [place] and will  her RX next Monday.          Follow-up: 1 month     Alexis Oneill  Specialty Pharmacy Technician

## 2021-12-30 ENCOUNTER — OFFICE VISIT (OUTPATIENT)
Dept: OBSTETRICS AND GYNECOLOGY | Facility: CLINIC | Age: 26
End: 2021-12-30

## 2021-12-30 ENCOUNTER — LAB (OUTPATIENT)
Dept: LAB | Facility: HOSPITAL | Age: 26
End: 2021-12-30

## 2021-12-30 VITALS
SYSTOLIC BLOOD PRESSURE: 124 MMHG | WEIGHT: 293 LBS | DIASTOLIC BLOOD PRESSURE: 72 MMHG | HEIGHT: 66 IN | BODY MASS INDEX: 47.09 KG/M2

## 2021-12-30 DIAGNOSIS — N63.20 LEFT BREAST LUMP: Primary | ICD-10-CM

## 2021-12-30 DIAGNOSIS — Z11.3 SCREEN FOR STD (SEXUALLY TRANSMITTED DISEASE): ICD-10-CM

## 2021-12-30 PROCEDURE — 87491 CHLMYD TRACH DNA AMP PROBE: CPT | Performed by: NURSE PRACTITIONER

## 2021-12-30 PROCEDURE — 99212 OFFICE O/P EST SF 10 MIN: CPT | Performed by: NURSE PRACTITIONER

## 2021-12-30 PROCEDURE — 87661 TRICHOMONAS VAGINALIS AMPLIF: CPT | Performed by: NURSE PRACTITIONER

## 2021-12-30 PROCEDURE — 87591 N.GONORRHOEAE DNA AMP PROB: CPT | Performed by: NURSE PRACTITIONER

## 2021-12-30 RX ORDER — LANOLIN ALCOHOL/MO/W.PET/CERES
1000 CREAM (GRAM) TOPICAL DAILY
COMMUNITY

## 2021-12-30 NOTE — PROGRESS NOTES
Subjective   Justice Jovita Rey is a 26 y.o. left breast lump     LMP: 11/27/2021  Pap: WNL, 2020  BC: POP    Pt presents with complaints of left breast lump.  She first noticed the lump around a year ago.  Denies any breast or nipple skin color changes, discharged, or pain.      Breast Problem  This is a chronic problem. The current episode started more than 1 month ago. The problem has been unchanged. Associated symptoms include fatigue. Pertinent negatives include no abdominal pain, anorexia, arthralgias, change in bowel habit, chest pain, chills, congestion, coughing, diaphoresis, fever, headaches, joint swelling, myalgias, nausea, neck pain, numbness, rash, sore throat, swollen glands, urinary symptoms, vertigo, visual change, vomiting or weakness. Nothing aggravates the symptoms. She has tried nothing for the symptoms.       The following portions of the patient's history were reviewed and updated as appropriate: allergies, current medications, past family history, past medical history, past social history, past surgical history and problem list.    Review of Systems   Constitutional: Positive for fatigue. Negative for chills, diaphoresis, fever and unexpected weight change.   HENT: Negative for congestion and sore throat.    Respiratory: Negative for apnea, cough, chest tightness and shortness of breath.    Cardiovascular: Negative for chest pain and palpitations.   Gastrointestinal: Positive for diarrhea. Negative for abdominal distention, abdominal pain, anorexia, change in bowel habit, constipation, nausea and vomiting.   Genitourinary: Negative for decreased urine volume, difficulty urinating, dysuria, enuresis, flank pain, frequency, genital sores, hematuria, pelvic pain, urgency, vaginal bleeding, vaginal discharge and vaginal pain.   Musculoskeletal: Negative for arthralgias, joint swelling, myalgias and neck pain.   Skin: Negative for rash.   Neurological: Negative for vertigo, weakness, numbness and  headaches.   Psychiatric/Behavioral: Negative for sleep disturbance and suicidal ideas.         Objective   Physical Exam  Vitals and nursing note reviewed.   Constitutional:       General: She is awake. She is not in acute distress.     Appearance: Normal appearance. She is well-developed and well-groomed. She is not ill-appearing, toxic-appearing or diaphoretic.   Cardiovascular:      Rate and Rhythm: Normal rate and regular rhythm.      Heart sounds: Normal heart sounds.   Pulmonary:      Effort: Pulmonary effort is normal.      Breath sounds: Normal breath sounds.   Chest:   Breasts: Breasts are symmetrical.      Right: No swelling, bleeding, inverted nipple, mass, nipple discharge, skin change or tenderness.      Left: No swelling, bleeding, inverted nipple, nipple discharge, skin change or tenderness.            Comments: Bilateral fibrocystic breast tissue noted, pt helped with palpation of lump approximately size of blueberry.  Skin:     General: Skin is warm and dry.   Neurological:      Mental Status: She is alert and oriented to person, place, and time.   Psychiatric:         Attention and Perception: Attention and perception normal.         Mood and Affect: Mood and affect normal.         Speech: Speech normal.         Behavior: Behavior normal. Behavior is cooperative.           Assessment/Plan   Diagnoses and all orders for this visit:    1. Left breast lump (Primary)  -     US Breast Left Limited; Future    2. Screen for STD (sexually transmitted disease)  -     Chlamydia trachomatis, Neisseria gonorrhoeae, Trichomonas vaginalis, PCR - Urine, Urine, Clean Catch      PE: left breast lump, slightly deep within fibrocystic breast tissue.  Told pt to [place] area before US.  STD screening labs placed.  Will call with results and plan of care.

## 2021-12-31 LAB
C TRACH RRNA CVX QL NAA+PROBE: NEGATIVE
N GONORRHOEA RRNA SPEC QL NAA+PROBE: NEGATIVE
TRICHOMONAS VAGINALIS PCR: NEGATIVE

## 2022-01-03 ENCOUNTER — TRANSCRIBE ORDERS (OUTPATIENT)
Dept: ADMINISTRATIVE | Facility: HOSPITAL | Age: 27
End: 2022-01-03

## 2022-01-03 DIAGNOSIS — N63.20 LUMP OF LEFT BREAST: Primary | ICD-10-CM

## 2022-01-04 DIAGNOSIS — J45.40 MODERATE PERSISTENT ASTHMA, UNSPECIFIED WHETHER COMPLICATED: ICD-10-CM

## 2022-01-04 RX ORDER — ALBUTEROL SULFATE 90 UG/1
2 AEROSOL, METERED RESPIRATORY (INHALATION) EVERY 4 HOURS PRN
Qty: 18 G | Refills: 11 | Status: SHIPPED | OUTPATIENT
Start: 2022-01-04 | End: 2022-10-31 | Stop reason: SDUPTHER

## 2022-01-10 ENCOUNTER — HOSPITAL ENCOUNTER (OUTPATIENT)
Dept: ULTRASOUND IMAGING | Facility: HOSPITAL | Age: 27
Discharge: HOME | End: 2022-01-10
Admitting: NURSE PRACTITIONER

## 2022-01-10 DIAGNOSIS — N63.20 LUMP OF LEFT BREAST: ICD-10-CM

## 2022-01-10 PROCEDURE — 76642 ULTRASOUND BREAST LIMITED: CPT

## 2022-01-10 PROCEDURE — 76642 ULTRASOUND BREAST LIMITED: CPT | Performed by: RADIOLOGY

## 2022-01-21 ENCOUNTER — SPECIALTY PHARMACY (OUTPATIENT)
Dept: ENDOCRINOLOGY | Facility: CLINIC | Age: 27
End: 2022-01-21

## 2022-01-21 NOTE — PROGRESS NOTES
Specialty Pharmacy Refill Coordination Note     Justice is a 26 y.o. female contacted today regarding refills of  Ozempic specialty medication(s).    Reviewed and verified with patient:  Allergies       Specialty medication(s) and dose(s) confirmed: yes    Refill Questions      Most Recent Value   Changes to allergies? No   Changes to medications? No   New conditions since last clinic visit No   Unplanned office visit, urgent care, ED, or hospital admission in the last 4 weeks  No   How does patient/caregiver feel medication is working? Very good   Financial problems or insurance changes  No   If yes, describe changes in insurance or financial issues. na   How many doses of your specialty medications were missed in the last 4 weeks? 1   Why were doses missed? pt sick   Does this patient require a clinical escalation to a pharmacist? No          Delivery Questions      Most Recent Value   Delivery method FedEx   Delivery address correct? No  [[address] [place] [address] [address]]   Delivery phone number [phone]   Preferred delivery time? PM   Number of medications in delivery 1   Medication being filled and delivered ozempic   Doses left of specialty medications 0   Is there any medication that is due not being filled? Yes  [metformin]   Medication that does not need to be filled at this time metformin   Why are other medications not being filled? doesn't need any   Copay form of payment Pay at pickup   Questions or concerns for the pharmacist? No   Are any medications first time fills? No        Has been sick alittle since Christmas so didn't take medication 1 week.  She is not going to be coming home and needs medication shipped this month.  We will look at increasing her dosage next month.         Follow-up: 1 month     Alexis Oneill  Specialty Pharmacy Technician

## 2022-02-01 ENCOUNTER — TELEPHONE (OUTPATIENT)
Dept: ENDOCRINOLOGY | Facility: CLINIC | Age: 27
End: 2022-02-01

## 2022-02-01 NOTE — TELEPHONE ENCOUNTER
Pt needs to know when to do labs and if she should keep the appointment on 2/7/22 since she didn't have the labs done. Thank you

## 2022-02-07 ENCOUNTER — TELEMEDICINE (OUTPATIENT)
Dept: ENDOCRINOLOGY | Facility: CLINIC | Age: 27
End: 2022-02-07

## 2022-02-07 DIAGNOSIS — E34.9 FEMALE HYPERTESTOSTERONEMIA: Primary | ICD-10-CM

## 2022-02-07 DIAGNOSIS — E16.1 HYPERINSULINEMIA: ICD-10-CM

## 2022-02-07 PROCEDURE — 99214 OFFICE O/P EST MOD 30 MIN: CPT | Performed by: INTERNAL MEDICINE

## 2022-02-07 NOTE — PROGRESS NOTES
CC  PCOS       History of Present Illness    27 y.o. female w elevated testosterone    Symptoms : Oligomenorrhea, Hirsutism, Weight gain before but now on ozempic w 20 lb weight loss        Location of Hirsutism : Chin , chest , abdomen   Has improved     No acne, no balding, no voice deepening     Duration of symptoms : since age 24     Treatment - progestin only contraceptive         ==========================================  Physical Exam  There were no vitals taken for this visit.  AOx3  No Goiter , no carotid bruit  RRR  CTA  No Edema     ==========================================    Laboratory Workup    Lab Results   Component Value Date    WBC 8.23 10/26/2021    HGB 15.4 10/26/2021    HCT 45.1 10/26/2021    MCV 87.1 10/26/2021     10/26/2021       Lab Results   Component Value Date    GLUCOSE 100 (H) 10/26/2021    BUN 8 10/26/2021    CREATININE 0.72 10/26/2021    EGFRIFNONA 100 10/26/2021    BCR 11.1 10/26/2021    K 4.5 10/26/2021    CO2 27.2 10/26/2021    CALCIUM 9.3 10/26/2021    ALBUMIN 4.60 10/26/2021    AST 21 10/26/2021    ALT 38 (H) 10/26/2021     Component      Latest Ref Rng & Units 10/26/2021   17-Hydroxyprogesterone      ng/dL 21   Anti-Mullerian Hormone (AMH)      ng/mL 2.14   Testosterone, Total      8.40 - 48.10 ng/dL 43.40   HCG Qualitative      Negative Negative   DHEA-Sulfate      110.0 - 431.7 ug/dL 203.0         ==========================================      ICD-10-CM ICD-9-CM   1. 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 Hyperinsulinemia  E16.1 251.1   -    Most consistent w PCOS    Ruled out CAH and cushing's by [facility]        metformin 1 g bid , 500 mg bid     ozempic 0.5 mg weekly     w 20 lb weight loss     She is on progestin only ocp and having a cycle at least every 3months  She will establish w gynecologist     No aldactone for now    -----------------------      Previous conversations w me    Ovasitol ( inositol ) is a supplement that has been possibly described to  improve ovluatory  cycle.  Since you are not having side effects and we are seeing normal testosterone levels then I would consider that can continue     Gluten free is difficult but many patients refer improvement in symptoms adopting this diet so please continue      Thank you       No orders of the defined types were placed in this encounter.               This document has been electronically signed by Jordi Edwards MD on February 7, 2022 09:30 CST

## 2022-02-15 ENCOUNTER — SPECIALTY PHARMACY (OUTPATIENT)
Dept: ENDOCRINOLOGY | Facility: CLINIC | Age: 27
End: 2022-02-15

## 2022-02-15 RX ORDER — ACETAMINOPHEN AND CODEINE PHOSPHATE 120; 12 MG/5ML; MG/5ML
1 SOLUTION ORAL DAILY
COMMUNITY
End: 2022-09-30

## 2022-02-15 NOTE — PROGRESS NOTES
Specialty Pharmacy Refill Coordination Note     Justice is a 27 y.o. female contacted today regarding refills of  ozempic specialty medication(s).    Reviewed and verified with patient:  Allergies  Meds       Specialty medication(s) and dose(s) confirmed: yes    Refill Questions      Most Recent Value   Changes to allergies? No   Changes to medications? No   New conditions since last clinic visit No   Unplanned office visit, urgent care, ED, or hospital admission in the last 4 weeks  No   How does patient/caregiver feel medication is working? Excellent   Financial problems or insurance changes  No   If yes, describe changes in insurance or financial issues. na   How many doses of your specialty medications were missed in the last 4 weeks? 0   Why were doses missed? na   Does this patient require a clinical escalation to a pharmacist? No          Delivery Questions      Most Recent Value   Delivery method FedEx   Delivery address correct? No   [[address] [place] [address]]   Delivery phone number [phone]   Number of medications in delivery 2   Medication being filled and delivered metformin and ozempic   Doses left of specialty medications 1   Is there any medication that is due not being filled? No   Cooler needed? No   Do any medications need mixed or dated? No   Copay form of payment Credit card on file   Questions or concerns for the pharmacist? No   Are any medications first time fills? No        Staying on the 0.5mg at this time per Dr Hermes garcia last week.         Follow-up: 1 month     Alexis Oneill  Specialty Pharmacy Technician

## 2022-03-14 ENCOUNTER — SPECIALTY PHARMACY (OUTPATIENT)
Dept: ENDOCRINOLOGY | Facility: CLINIC | Age: 27
End: 2022-03-14

## 2022-03-14 NOTE — PROGRESS NOTES
Specialty Pharmacy Refill Coordination Note     Justice is a 27 y.o. female contacted today regarding refills of  Ozempic specialty medication(s).    Reviewed and verified with patient:  Allergies  Meds         Specialty medication(s) and dose(s) confirmed: yes    Refill Questions    Flowsheet Row Most Recent Value   Changes to allergies? No   Changes to medications? No   New conditions since last clinic visit No   Unplanned office visit, urgent care, ED, or hospital admission in the last 4 weeks  No   How does patient/caregiver feel medication is working? Excellent   Financial problems or insurance changes  No   If yes, describe changes in insurance or financial issues. na   How many doses of your specialty medications were missed in the last 4 weeks? 0   Why were doses missed? na   Does this patient require a clinical escalation to a pharmacist? No          Delivery Questions    Flowsheet Row Most Recent Value   Delivery method  at Pharmacy   Medication being filled and delivered ozempic   Doses left of specialty medications 1   Is there any medication that is due not being filled? No   Medication that does not need to be filled at this time metformin   Why are other medications not being filled? Discontinued   Cooler needed? No   Do any medications need mixed or dated? No   Copay form of payment Credit card on file   Questions or concerns for the pharmacist? No          Patient is no longer taking metformin and will be in town and able to  the ozempic at the pharmacy.        Follow-up: 1 month.      Alexis Oneill  Specialty Pharmacy Technician

## 2022-04-02 ENCOUNTER — TELEMEDICINE (OUTPATIENT)
Dept: FAMILY MEDICINE CLINIC | Facility: TELEHEALTH | Age: 27
End: 2022-04-02

## 2022-04-02 DIAGNOSIS — R39.89 SUSPECTED UTI: Primary | ICD-10-CM

## 2022-04-02 PROCEDURE — 99213 OFFICE O/P EST LOW 20 MIN: CPT | Performed by: NURSE PRACTITIONER

## 2022-04-02 RX ORDER — NITROFURANTOIN 25; 75 MG/1; MG/1
100 CAPSULE ORAL 2 TIMES DAILY
Qty: 10 CAPSULE | Refills: 0 | Status: SHIPPED | OUTPATIENT
Start: 2022-04-02 | End: 2022-04-07

## 2022-04-02 NOTE — PATIENT INSTRUCTIONS
Drink plenty of water and avoid caffeine  If symptoms do not improve in 3-5 days follow up with your primary care provider or urgent care Urinary Tract Infection, Adult  A urinary tract infection (UTI) is an infection of any part of the urinary tract. The urinary tract includes:  The kidneys.  The ureters.  The bladder.  The urethra.  These organs make, store, and get rid of pee (urine) in the body.  What are the causes?  This infection is caused by germs (bacteria) in your genital area. These germs grow and cause swelling (inflammation) of your urinary tract.  What increases the risk?  The following factors may make you more likely to develop this condition:  Using a small, thin tube (catheter) to drain pee.  Not being able to control when you pee or poop (incontinence).  Being female. If you are female, these things can increase the risk:  Using these methods to prevent pregnancy:  A medicine that kills sperm (spermicide).  A device that blocks sperm (diaphragm).  Having low levels of a female hormone (estrogen).  Being pregnant.  You are more likely to develop this condition if:  You have genes that add to your risk.  You are sexually active.  You take antibiotic medicines.  You have trouble peeing because of:  A prostate that is bigger than normal, if you are male.  A blockage in the part of your body that drains pee from the bladder.  A kidney stone.  A nerve condition that affects your bladder.  Not getting enough to drink.  Not peeing often enough.  You have other conditions, such as:  Diabetes.  A weak disease-fighting system (immune system).  Sickle cell disease.  Gout.  Injury of the spine.  What are the signs or symptoms?  Symptoms of this condition include:  Needing to pee right away.  Peeing small amounts often.  Pain or burning when peeing.  Blood in the pee.  Pee that smells bad or not like normal.  Trouble peeing.  Pee that is cloudy.  Fluid coming from the vagina, if you are female.  Pain in the  belly or lower back.  Other symptoms include:  Vomiting.  Not feeling hungry.  Feeling mixed up (confused). This may be the first symptom in older adults.  Being tired and grouchy (irritable).  A fever.  Watery poop (diarrhea).  How is this treated?  Taking antibiotic medicine.  Taking other medicines.  Drinking enough water.  In some cases, you may need to see a specialist.  Follow these instructions at home:    Medicines  Take over-the-counter and prescription medicines only as told by your doctor.  If you were prescribed an antibiotic medicine, take it as told by your doctor. Do not stop taking it even if you start to feel better.  General instructions  Make sure you:  Pee until your bladder is empty.  Do not hold pee for a long time.  Empty your bladder after sex.  Wipe from front to back after peeing or pooping if you are a female. Use each tissue one time when you wipe.  Drink enough fluid to keep your pee pale yellow.  Keep all follow-up visits.  Contact a doctor if:  You do not get better after 1-2 days.  Your symptoms go away and then come back.  Get help right away if:  You have very bad back pain.  You have very bad pain in your lower belly.  You have a fever.  You have chills.  You feeling like you will vomit or you vomit.  Summary  A urinary tract infection (UTI) is an infection of any part of the urinary tract.  This condition is caused by germs in your genital area.  There are many risk factors for a UTI.  Treatment includes antibiotic medicines.  Drink enough fluid to keep your pee pale yellow.  This information is not intended to replace advice given to you by your health care provider. Make sure you discuss any questions you have with your health care provider.  Document Revised: 07/30/2021 Document Reviewed: 07/30/2021  [employer] Patient Education © 2021 [employer]

## 2022-04-02 NOTE — PROGRESS NOTES
You have chosen to receive care through a telehealth visit.  Do you consent to use a video/audio connection for your medical care today? Yes     CHIEF COMPLAINT  Chief Complaint   Patient presents with   • Urinary Tract Infection         HPI  Justice Rey is a 27 y.o. female  presents with complaint of cloudy urine, burning with urination, frequency and urgency.     Review of Systems   Constitutional: Negative for chills, diaphoresis, fatigue and fever.   Gastrointestinal: Negative for diarrhea, nausea and vomiting.   Genitourinary: Positive for dysuria, frequency and urgency. Negative for flank pain, genital sores, hematuria, pelvic pain, vaginal discharge and vaginal pain.       Past Medical History:   Diagnosis Date   • Allergic rhinitis    • Ankle pain    • Asthma     IgE-mediated allergic asthma      • Body mass index 40.0-44.9, adult ([facility])     BMI 42.5   • Bronchitis    • Chronic fatigue, unspecified    • Encounter for screening for diabetes mellitus    • Hematoma     is traumatic   • Insomnia, unspecified    • Pain in lower limb    • Polycystic ovary syndrome 2021    Officially diagnosed 2021   • Rhinitis    • Ruptured varicose vein     RLE   • Sprain of ankle    • Upper respiratory infection    • Urinary tract infection 2021       Family History   Problem Relation Age of Onset   • Diabetes Other    • Diabetes Maternal Aunt         Type 1 -    • Diabetes Paternal Uncle         Type 1 -    • Ovarian cancer Neg Hx    • Breast cancer Neg Hx        Social History     Socioeconomic History   • Marital status: Single   Tobacco Use   • Smoking status: Never Smoker   • Smokeless tobacco: Never Used   Vaping Use   • Vaping Use: Never used   Substance and Sexual Activity   • Alcohol use: Yes     Alcohol/week: 5.0 standard drinks     Types: 1 Glasses of wine, 4 Cans of beer per week     Comment: Social drinking on weekends   • Drug use: Yes     Frequency: 1.0 times per week      Types: Marijuana   • Sexual activity: Yes     Partners: Male     Birth control/protection: Condom       Justice Rey has never smoked and does not vape.               There were no vitals taken for this visit.    PHYSICAL EXAM  Physical Exam   Constitutional: She is oriented to person, place, and time. She appears well-developed and well-nourished. She does not have a sickly appearance. She does not appear ill. No distress.   HENT:   Head: Normocephalic and atraumatic.   Eyes: EOM are normal.   Neck: Neck normal appearance.  Pulmonary/Chest: Effort normal.  No respiratory distress.  Neurological: She is alert and oriented to person, place, and time.   Skin: Skin is dry.   Psychiatric: She has a normal mood and affect.         Diagnoses and all orders for this visit:    1. Suspected UTI (Primary)    Other orders  -     nitrofurantoin, macrocrystal-monohydrate, (Macrobid) 100 MG capsule; Take 1 capsule by mouth 2 (Two) Times a Day for 5 days.  Dispense: 10 capsule; Refill: 0          FOLLOW-UP  As discussed during visit with PCP/[facility] Care if no improvement or Urgent Care/Emergency Department if worsening of symptoms    Patient verbalizes understanding of medication dosage, comfort measures, instructions for treatment and follow-up.    NIHARIKA Phillips  04/02/2022  17:41 EDT    This visit was performed via Telehealth.  This patient has been instructed to follow-up with their primary care provider if their symptoms worsen or the treatment provided does not resolve their illness.

## 2022-04-07 ENCOUNTER — SPECIALTY PHARMACY (OUTPATIENT)
Dept: ENDOCRINOLOGY | Facility: CLINIC | Age: 27
End: 2022-04-07

## 2022-04-07 RX ORDER — SEMAGLUTIDE 1.34 MG/ML
1 INJECTION, SOLUTION SUBCUTANEOUS WEEKLY
Qty: 3 ML | Refills: 11 | Status: SHIPPED | OUTPATIENT
Start: 2022-04-07

## 2022-04-07 NOTE — PROGRESS NOTES
Specialty Pharmacy Refill Coordination Note     Justice is a 27 y.o. female contacted today regarding refills of  Ozempic specialty medication(s).    Reviewed and verified with patient:  Allergies         Specialty medication(s) and dose(s) confirmed: no    Refill Questions    Flowsheet Row Most Recent Value   Changes to allergies? No   Changes to medications? No   New conditions since last clinic visit No   Unplanned office visit, urgent care, ED, or hospital admission in the last 4 weeks  No   How does patient/caregiver feel medication is working? Excellent   Financial problems or insurance changes  No   If yes, describe changes in insurance or financial issues. na   How many doses of your specialty medications were missed in the last 4 weeks? 0   Why were doses missed? na   Does this patient require a clinical escalation to a pharmacist? No          Delivery Questions    Flowsheet Row Most Recent Value   Delivery method FedEx   Delivery address correct? No  [[address]  [place] [address]]   Delivery phone number [phone]   Preferred delivery time? PM   Number of medications in delivery 1   Medication being filled and delivered ozempic   Doses left of specialty medications 1   Is there any medication that is due not being filled? No   Copay form of payment Credit card on file   Questions or concerns for the pharmacist? No   Are any medications first time fills? No        Reviewed the chart with Raymond and Dr Mirza and they are going to increase the dose of ozempic.  Mail out to [address]  [place] [address]            Follow-up: 1 month     Alexis Oneill  Specialty Pharmacy Technician

## 2022-05-06 ENCOUNTER — SPECIALTY PHARMACY (OUTPATIENT)
Dept: ENDOCRINOLOGY | Facility: CLINIC | Age: 27
End: 2022-05-06

## 2022-05-06 NOTE — PROGRESS NOTES
Specialty Pharmacy Refill Coordination Note     Justice is a 27 y.o. female contacted today regarding refills of  Ozempic specialty medication(s).    Reviewed and verified with patient:  Allergies         Specialty medication(s) and dose(s) confirmed: yes    Refill Questions    Flowsheet Row Most Recent Value   Changes to allergies? No   Changes to medications? No   New conditions since last clinic visit No   Unplanned office visit, urgent care, ED, or hospital admission in the last 4 weeks  No   How does patient/caregiver feel medication is working? Excellent   Financial problems or insurance changes  No   If yes, describe changes in insurance or financial issues. na   How many doses of your specialty medications were missed in the last 4 weeks? 0   Why were doses missed? na   Does this patient require a clinical escalation to a pharmacist? No          Delivery Questions    Flowsheet Row Most Recent Value   Delivery method FedEx   Delivery address correct? No  [[address] [place], [place]]   Delivery phone number [phone]   Number of medications in delivery 1   Medication being filled and delivered ozempic   Doses left of specialty medications 1   Is there any medication that is due not being filled? No   Cooler needed? Yes   Do any medications need mixed or dated? No   Copay form of payment Credit card on file   Questions or concerns for the pharmacist? No   Are any medications first time fills? No        Patient is doing well and we will mail out Monday to the address provided.           Follow-up: 1 month     Alexis Oneill  Specialty Pharmacy Technician

## 2022-06-10 ENCOUNTER — SPECIALTY PHARMACY (OUTPATIENT)
Dept: ENDOCRINOLOGY | Facility: CLINIC | Age: 27
End: 2022-06-10

## 2022-06-10 NOTE — PROGRESS NOTES
Specialty Pharmacy Refill Coordination Note     Justice is a 27 y.o. female contacted today regarding refills of  ozempic specialty medication(s).    Reviewed and verified with patient:  Allergies         Specialty medication(s) and dose(s) confirmed: yes    Refill Questions    Flowsheet Row Most Recent Value   Changes to allergies? No   Changes to medications? No   New conditions since last clinic visit No   Unplanned office visit, urgent care, ED, or hospital admission in the last 4 weeks  No   How does patient/caregiver feel medication is working? Excellent   Financial problems or insurance changes  No   If yes, describe changes in insurance or financial issues. na   Since the previous refill, were any specialty medication doses or scheduled injections missed or delayed?  No   Does this patient require a clinical escalation to a pharmacist? No          Delivery Questions    Flowsheet Row Most Recent Value   Delivery method FedEx   Delivery address correct? No  [Use [employer] addresss]   Delivery phone number [phone]   Number of medications in delivery 1   Medication being filled and delivered ozempic   Doses left of specialty medications 1   Is there any medication that is due not being filled? No   Supplies needed? No supplies needed   Cooler needed? No   Do any medications need mixed or dated? No   Copay form of payment Credit card on file   Questions or concerns for the pharmacist? No   Are any medications first time fills? No        No issues to report, pt is still taking the medication and we will mail out on Monday.          Follow-up: 1 month.      Alexis Oneill  Specialty Pharmacy Technician

## 2022-07-07 ENCOUNTER — SPECIALTY PHARMACY (OUTPATIENT)
Dept: ENDOCRINOLOGY | Facility: CLINIC | Age: 27
End: 2022-07-07

## 2022-07-07 NOTE — PROGRESS NOTES
Specialty Pharmacy Refill Coordination Note     Justice is a 27 y.o. female contacted today regarding refills of  Ozempic  specialty medication(s).    Reviewed and verified with patient:  Allergies         Specialty medication(s) and dose(s) confirmed: yes    Refill Questions    Flowsheet Row Most Recent Value   Changes to allergies? No   Changes to medications? No   New conditions since last clinic visit No   Unplanned office visit, urgent care, ED, or hospital admission in the last 4 weeks  No   Financial problems or insurance changes  No   If yes, describe changes in insurance or financial issues. na   Since the previous refill, were any specialty medication doses or scheduled injections missed or delayed?  No   Does this patient require a clinical escalation to a pharmacist? No          Delivery Questions    Flowsheet Row Most Recent Value   Delivery method FedEx   Delivery address correct? No  [Use shipping address in [facility] ]   Delivery phone number [phone]   Number of medications in delivery 1   Medication being filled and delivered ozempic   Doses left of specialty medications 1   Is there any medication that is due not being filled? No   Supplies needed? No supplies needed   Cooler needed? Yes   Do any medications need mixed or dated? No   Copay form of payment Credit card on file   Questions or concerns for the pharmacist? No   Are any medications first time fills? No        Patient reports no issues and she is ready for a refill.  Her address has changed and this was added to the pharmacy mail out. [address] [facility]  [place].          Follow-up: 1 month.      Alexis Oneill  Specialty Pharmacy Technician

## 2022-08-12 ENCOUNTER — SPECIALTY PHARMACY (OUTPATIENT)
Dept: ENDOCRINOLOGY | Facility: CLINIC | Age: 27
End: 2022-08-12

## 2022-08-12 NOTE — PROGRESS NOTES
Specialty Pharmacy Refill Coordination Note     Justice is a 27 y.o. female contacted today regarding refills of  ozempic specialty medication(s).    Reviewed and verified with patient:       Specialty medication(s) and dose(s) confirmed: yes    Refill Questions    Flowsheet Row Most Recent Value   Changes to allergies? No   Changes to medications? No   New conditions since last clinic visit No   Unplanned office visit, urgent care, ED, or hospital admission in the last 4 weeks  No   How does patient/caregiver feel medication is working? Excellent   Financial problems or insurance changes  No   Since the previous refill, were any specialty medication doses or scheduled injections missed or delayed?  No   Does this patient require a clinical escalation to a pharmacist? No          Delivery Questions    Flowsheet Row Most Recent Value   Delivery method FedEx   Delivery address correct? No   Delivery phone number [phone]   Number of medications in delivery 1   Medication being filled and delivered ozempic   Doses left of specialty medications 0   Is there any medication that is due not being filled? No   Supplies needed? No supplies needed   Cooler needed? Yes   Do any medications need mixed or dated? No   Copay form of payment Credit card on file   Questions or concerns for the pharmacist? No   Are any medications first time fills? No        Per patient request we are mailing her ozempic out on Monday to the [place] address provided.           Follow-up: 1 month.      Alexis Oneill  Specialty Pharmacy Technician

## 2022-09-08 ENCOUNTER — SPECIALTY PHARMACY (OUTPATIENT)
Dept: ENDOCRINOLOGY | Facility: CLINIC | Age: 27
End: 2022-09-08

## 2022-09-08 NOTE — PROGRESS NOTES
Specialty Pharmacy Refill Coordination Note     Justice is a 27 y.o. female contacted today regarding refills of  ozempic specialty medication(s).    Reviewed and verified with patient:       Specialty medication(s) and dose(s) confirmed: yes    Refill Questions    Flowsheet Row Most Recent Value   Changes to allergies? No   Changes to medications? No   New conditions since last clinic visit No   Unplanned office visit, urgent care, ED, or hospital admission in the last 4 weeks  No   How does patient/caregiver feel medication is working? Excellent   Financial problems or insurance changes  No   Since the previous refill, were any specialty medication doses or scheduled injections missed or delayed?  No   Does this patient require a clinical escalation to a pharmacist? No        No issues to report, we will mail out on Monday.               Follow-up: 1 month.      Alexis Oneill  Specialty Pharmacy Technician

## 2022-09-30 ENCOUNTER — OFFICE VISIT (OUTPATIENT)
Dept: FAMILY MEDICINE CLINIC | Facility: CLINIC | Age: 27
End: 2022-09-30

## 2022-09-30 VITALS
HEIGHT: 66 IN | SYSTOLIC BLOOD PRESSURE: 126 MMHG | WEIGHT: 293 LBS | BODY MASS INDEX: 47.09 KG/M2 | DIASTOLIC BLOOD PRESSURE: 74 MMHG

## 2022-09-30 DIAGNOSIS — G43.B0 OPHTHALMOPLEGIC MIGRAINE, NOT INTRACTABLE: ICD-10-CM

## 2022-09-30 DIAGNOSIS — E66.01 MORBID (SEVERE) OBESITY DUE TO EXCESS CALORIES: Primary | Chronic | ICD-10-CM

## 2022-09-30 PROCEDURE — 99213 OFFICE O/P EST LOW 20 MIN: CPT | Performed by: FAMILY MEDICINE

## 2022-09-30 NOTE — PROGRESS NOTES
TimeAnswers for HPI/ROS submitted by the patient on 9/24/2022  Please describe your symptoms.: Weight  Have you had these symptoms before?: Yes  How long have you been having these symptoms?: Greater than 2 weeks  Please list any medications you are currently taking for this condition.: Ozempic  What is the primary reason for your visit?: Other    Subjective   Justice Jovita Rey is a 27 y.o. female.  Requesting evaluation and possible referral.  Patient states of been thinking about bariatric procedures for weight.  BMI now 49.5.  Last year was 53.3.  Jerrell currently on Ozempic and has lost approximately 24 pounds in the past year.  States has plateaued.  Also complaining of ophthalmologic type migraines has had a flashing lights vision loss in both eyes off and on past year or so worse last couple months.  Was told by optometry it may be ophthalmologic migraine.  Is going to need neurologic consultation given history.  Patient care lives in [facility].  Chart reviewed.    History of Present Illness   HPI    The following portions of the patient's history were reviewed and updated as appropriate: allergies, current medications, past family history, past medical history, past social history, past surgical history and problem list.    Review of Systems  Review of Systems   Constitutional: Negative for activity change, appetite change, fatigue and unexpected weight change.   HENT: Negative for trouble swallowing and voice change.    Eyes: Positive for visual disturbance. Negative for redness.   Respiratory: Negative for cough and wheezing.    Cardiovascular: Negative for chest pain and palpitations.   Gastrointestinal: Negative for abdominal pain, constipation, diarrhea, nausea and vomiting.   Genitourinary: Negative for urgency.   Musculoskeletal: Negative for joint swelling.   Neurological: Negative for syncope and headaches.   Hematological: Negative for adenopathy.   Psychiatric/Behavioral: Negative for sleep  "disturbance.       Objective   Physical Exam  Physical Exam  Vitals reviewed.   Constitutional:       Appearance: Normal appearance. She is obese.   Neurological:      Mental Status: She is alert.   Psychiatric:         Mood and Affect: Mood normal.         Behavior: Behavior normal.         Thought Content: Thought content normal.         Judgment: Judgment normal.           Visit Vitals  /74   Ht 167.6 cm (66\")   Wt (!) 139 kg (306 lb 12.8 oz)   BMI 49.52 kg/m²     Body mass index is 49.52 kg/m².    /74   Ht 167.6 cm (66\")   Wt (!) 139 kg (306 lb 12.8 oz)   BMI 49.52 kg/m²     Assessment/Plan   Diagnoses and all orders for this visit:    1. Morbid (severe) obesity due to excess calories (HCC) (Primary)    2. Ophthalmoplegic migraine, not intractable  -     Ambulatory Referral to Neurology    Have counseled on not bariatric procedure pros and cons limitations of same.  Will ask around [place] and see about seeing a bariatric team will let us know about names for referral.  Counseled on ophthalmologic migraines.  Referred to neurology in [place] for evaluation for possible treatment.  Counseled on same.  Declines flu vaccine.  "

## 2022-10-06 ENCOUNTER — SPECIALTY PHARMACY (OUTPATIENT)
Dept: ENDOCRINOLOGY | Facility: CLINIC | Age: 27
End: 2022-10-06

## 2022-10-06 NOTE — PROGRESS NOTES
Specialty Pharmacy Refill Coordination Note     Justice is a 27 y.o. female contacted today regarding refills of  ozempic specialty medication(s).    Reviewed and verified with patient:       Specialty medication(s) and dose(s) confirmed: yes    Refill Questions    Flowsheet Row Most Recent Value   Changes to allergies? No   Changes to medications? No   New conditions since last clinic visit No   Unplanned office visit, urgent care, ED, or hospital admission in the last 4 weeks  No   How does patient/caregiver feel medication is working? Very good   Financial problems or insurance changes  No   Since the previous refill, were any specialty medication doses or scheduled injections missed or delayed?  Yes   If yes, please provide the amount 1 dose   Why were doses missed? she forgot   Does this patient require a clinical escalation to a pharmacist? No          Delivery Questions    Flowsheet Row Most Recent Value   Delivery method FedEx   Delivery address correct? Yes   Delivery phone number [phone]   Number of medications in delivery 1   Medication being filled and delivered ozempic   Is there any medication that is due not being filled? No   Supplies needed? No supplies needed   Cooler needed? Yes   Do any medications need mixed or dated? No   Questions or concerns for the pharmacist? No   Are any medications first time fills? No                 Follow-up: 30 day(s)     Leslie Johnson, Pharmacy Technician  Specialty Pharmacy Technician

## 2022-10-31 DIAGNOSIS — J45.40 MODERATE PERSISTENT ASTHMA, UNSPECIFIED WHETHER COMPLICATED: ICD-10-CM

## 2022-10-31 RX ORDER — ALBUTEROL SULFATE 90 UG/1
2 AEROSOL, METERED RESPIRATORY (INHALATION) EVERY 4 HOURS PRN
Qty: 18 G | Refills: 11 | Status: SHIPPED | OUTPATIENT
Start: 2022-10-31

## 2022-11-08 ENCOUNTER — HOSPITAL ENCOUNTER (OUTPATIENT)
Age: 27
End: 2022-11-08
Payer: COMMERCIAL

## 2022-11-08 ENCOUNTER — SPECIALTY PHARMACY (OUTPATIENT)
Dept: ENDOCRINOLOGY | Facility: CLINIC | Age: 27
End: 2022-11-08

## 2022-11-08 DIAGNOSIS — Z12.31: Primary | ICD-10-CM

## 2022-11-08 PROCEDURE — 77067 SCR MAMMO BI INCL CAD: CPT

## 2022-11-08 PROCEDURE — 77063 BREAST TOMOSYNTHESIS BI: CPT

## 2022-11-08 NOTE — PROGRESS NOTES
Specialty Pharmacy Refill Coordination Note     Justice is a 27 y.o. female contacted today regarding refills of  oxempic specialty medication(s).    Reviewed and verified with patient:       Specialty medication(s) and dose(s) confirmed: yes    Refill Questions    Flowsheet Row Most Recent Value   Changes to allergies? No   Changes to medications? No   New conditions since last clinic visit No   Unplanned office visit, urgent care, ED, or hospital admission in the last 4 weeks  No   How does patient/caregiver feel medication is working? Very good   Financial problems or insurance changes  No   Since the previous refill, were any specialty medication doses or scheduled injections missed or delayed?  No   Does this patient require a clinical escalation to a pharmacist? No          Delivery Questions    Flowsheet Row Most Recent Value   Delivery method FedEx   Delivery address correct? No  [[place] address]   Delivery phone number [phone]   Number of medications in delivery 1   Medication being filled and delivered ozempic   Doses left of specialty medications 1   Is there any medication that is due not being filled? No   Supplies needed? No supplies needed   Cooler needed? Yes   Do any medications need mixed or dated? No   Copay form of payment Credit card on file   Questions or concerns for the pharmacist? No   Are any medications first time fills? No        Doing great on the ozempic, she will stay on current dose and we will mail out on Tuesday next week.          Follow-up: 5 weeks.      Alexis Oneill  Specialty Pharmacy Technician

## 2022-11-11 ENCOUNTER — SPECIALTY PHARMACY (OUTPATIENT)
Dept: ENDOCRINOLOGY | Facility: CLINIC | Age: 27
End: 2022-11-11

## 2022-11-11 NOTE — PROGRESS NOTES
Waiting for next visit with Dr. Mirza, none scheduled  She may be a good candidate for mounjaro  
[facility]

## 2022-11-29 ENCOUNTER — TELEPHONE (OUTPATIENT)
Dept: OBSTETRICS AND GYNECOLOGY | Facility: CLINIC | Age: 27
End: 2022-11-29

## 2022-11-29 DIAGNOSIS — N63.20 LEFT BREAST LUMP: ICD-10-CM

## 2022-11-29 NOTE — TELEPHONE ENCOUNTER
Patient called wanting results of her mammogram she had at a different facility. She had this done through her work place. It showed mammogram was normal and recommend she have her next mammogram at age 42. Patient verbalized understanding.

## 2022-12-09 ENCOUNTER — SPECIALTY PHARMACY (OUTPATIENT)
Dept: ENDOCRINOLOGY | Facility: CLINIC | Age: 27
End: 2022-12-09

## 2022-12-09 NOTE — PROGRESS NOTES
Specialty Pharmacy Refill Coordination Note     Justice is a 27 y.o. female contacted today regarding refills of  ozempic specialty medication(s).    Reviewed and verified with patient: Allergies  Specialty medication(s) and dose(s) confirmed: yes    Refill Questions    Flowsheet Row Most Recent Value   Changes to allergies? No   Changes to medications? No   New conditions since last clinic visit No   Unplanned office visit, urgent care, ED, or hospital admission in the last 4 weeks  No   How does patient/caregiver feel medication is working? Very good   Financial problems or insurance changes  No   Since the previous refill, were any specialty medication doses or scheduled injections missed or delayed?  No   Does this patient require a clinical escalation to a pharmacist? No          Delivery Questions    Flowsheet Row Most Recent Value   Delivery method FedEx   Delivery address correct? Yes   Delivery phone number [phone]   Number of medications in delivery 1   Medication being filled and delivered ozempic   Doses left of specialty medications 2   Is there any medication that is due not being filled? No   Supplies needed? No supplies needed   Cooler needed? Yes   Do any medications need mixed or dated? No   Copay form of payment Credit card on file   Questions or concerns for the pharmacist? No   Are any medications first time fills? No        No issues to report, we will mail our her RX next week.           Follow-up: 1 month.      Alexis Oneill  Specialty Pharmacy Technician

## 2022-12-19 PROCEDURE — U0004 COV-19 TEST NON-CDC HGH THRU: HCPCS | Performed by: NURSE PRACTITIONER

## 2023-01-04 ENCOUNTER — SPECIALTY PHARMACY (OUTPATIENT)
Dept: ENDOCRINOLOGY | Facility: CLINIC | Age: 28
End: 2023-01-04
Payer: COMMERCIAL

## 2023-05-08 ENCOUNTER — TELEMEDICINE (OUTPATIENT)
Dept: FAMILY MEDICINE CLINIC | Facility: TELEHEALTH | Age: 28
End: 2023-05-08
Payer: COMMERCIAL

## 2023-05-08 DIAGNOSIS — J45.41 MODERATE PERSISTENT ASTHMA WITH ACUTE EXACERBATION: Primary | ICD-10-CM

## 2023-05-08 RX ORDER — FLUTICASONE PROPIONATE AND SALMETEROL 500; 50 UG/1; UG/1
1 POWDER RESPIRATORY (INHALATION)
Qty: 60 EACH | Refills: 0 | Status: SHIPPED | OUTPATIENT
Start: 2023-05-08 | End: 2023-06-07

## 2023-05-08 NOTE — PROGRESS NOTES
You have chosen to receive care through a telehealth visit.  Do you consent to use a video/audio connection for your medical care today? Yes     CHIEF COMPLAINT  No chief complaint on file.        HPI  Justice Rey is a 28 y.o. female  presents with complaint of seasonal allergies which has caused asthma flare up.  Having wheezing, shortness of breath and coughing.  She used to use Advair, but does not have it any more and would like refill.     Review of Systems  See HPI    Past Medical History:   Diagnosis Date    Allergic rhinitis     Ankle pain     Asthma     IgE-mediated allergic asthma       Body mass index 40.0-44.9, adult     BMI 42.5    Bronchitis     Chronic fatigue, unspecified     Encounter for screening for diabetes mellitus     Hematoma     is traumatic    Insomnia, unspecified     Pain in lower limb     Polycystic ovary syndrome 2021    Officially diagnosed 2021    Rhinitis     Ruptured varicose vein     RLE    Sprain of ankle     Upper respiratory infection     Urinary tract infection 2021       Family History   Problem Relation Age of Onset    Diabetes Other     Diabetes Maternal Aunt         Type 1 -     Diabetes Paternal Uncle         Type 1 -     Ovarian cancer Neg Hx     Breast cancer Neg Hx        Social History     Socioeconomic History    Marital status: Single   Tobacco Use    Smoking status: Never    Smokeless tobacco: Never   Vaping Use    Vaping Use: Never used   Substance and Sexual Activity    Alcohol use: Yes     Alcohol/week: 5.0 standard drinks     Types: 1 Glasses of wine, 4 Cans of beer per week     Comment: Social drinking on weekends    Drug use: Yes     Frequency: 1.0 times per week     Types: Marijuana    Sexual activity: Yes     Partners: Male     Birth control/protection: Condom       Justice Rey  reports that she has never smoked. She has never used smokeless tobacco..               There were no vitals taken for this  visit.    PHYSICAL EXAM  Physical Exam   Constitutional: She is oriented to person, place, and time. She appears well-developed and well-nourished. She does not have a sickly appearance. She does not appear ill.   HENT:   Head: Normocephalic and atraumatic.   Pulmonary/Chest: Effort normal.  No respiratory distress.  Neurological: She is alert and oriented to person, place, and time.       Diagnoses and all orders for this visit:    1. Moderate persistent asthma with acute exacerbation (Primary)  -     Fluticasone-Salmeterol (ADVAIR/WIXELA) 500-50 MCG/ACT DISKUS; Inhale 1 puff 2 (Two) Times a Day for 30 days.  Dispense: 60 each; Refill: 0    --take medications as prescribed  --increase fluids, rest as needed, tylenol or ibuprofen for pain  --f/u in 5-7 days if no improvement        FOLLOW-UP  As discussed during visit with PCP/[facility] if no improvement or Urgent Care/Emergency Department if worsening of symptoms    Patient verbalizes understanding of medication dosage, comfort measures, instructions for treatment and follow-up.    Alice Bronson, NIHARIKA  05/08/2023  09:45 EDT    The use of a video visit has been reviewed with the patient and verbal informed consent has been obtained. Myself and Justice Rey participated in this visit. The patient is located in [address] [place] [address].    I am located in [place], [place]. Mychart and Twilio were utilized. I spent 8 minutes in the patient's chart for this visit.

## 2023-05-12 ENCOUNTER — SPECIALTY PHARMACY (OUTPATIENT)
Dept: ENDOCRINOLOGY | Facility: CLINIC | Age: 28
End: 2023-05-12
Payer: COMMERCIAL

## 2023-10-11 ENCOUNTER — TELEPHONE (OUTPATIENT)
Dept: OBSTETRICS AND GYNECOLOGY | Facility: CLINIC | Age: 28
End: 2023-10-11
Payer: COMMERCIAL

## 2023-10-12 ENCOUNTER — OFFICE VISIT (OUTPATIENT)
Dept: INTERNAL MEDICINE | Facility: CLINIC | Age: 28
End: 2023-10-12
Payer: COMMERCIAL

## 2023-10-12 ENCOUNTER — OFFICE VISIT (OUTPATIENT)
Dept: OBSTETRICS AND GYNECOLOGY | Facility: CLINIC | Age: 28
End: 2023-10-12
Payer: COMMERCIAL

## 2023-10-12 ENCOUNTER — LAB (OUTPATIENT)
Dept: INTERNAL MEDICINE | Facility: CLINIC | Age: 28
End: 2023-10-12
Payer: COMMERCIAL

## 2023-10-12 VITALS
DIASTOLIC BLOOD PRESSURE: 94 MMHG | RESPIRATION RATE: 16 BRPM | HEIGHT: 66 IN | TEMPERATURE: 98.1 F | BODY MASS INDEX: 47.09 KG/M2 | HEART RATE: 77 BPM | WEIGHT: 293 LBS | OXYGEN SATURATION: 98 % | SYSTOLIC BLOOD PRESSURE: 138 MMHG

## 2023-10-12 VITALS
SYSTOLIC BLOOD PRESSURE: 120 MMHG | HEIGHT: 66 IN | BODY MASS INDEX: 47.09 KG/M2 | DIASTOLIC BLOOD PRESSURE: 68 MMHG | WEIGHT: 293 LBS

## 2023-10-12 DIAGNOSIS — Z01.419 WOMEN'S ANNUAL ROUTINE GYNECOLOGICAL EXAMINATION: Primary | ICD-10-CM

## 2023-10-12 DIAGNOSIS — Z13.220 LIPID SCREENING: ICD-10-CM

## 2023-10-12 DIAGNOSIS — Z01.419 PAP TEST, AS PART OF ROUTINE GYNECOLOGICAL EXAMINATION: ICD-10-CM

## 2023-10-12 DIAGNOSIS — Z13.1 SCREENING FOR DIABETES MELLITUS: ICD-10-CM

## 2023-10-12 DIAGNOSIS — N92.6 IRREGULAR PERIODS: ICD-10-CM

## 2023-10-12 DIAGNOSIS — Z13.21 ENCOUNTER FOR VITAMIN DEFICIENCY SCREENING: ICD-10-CM

## 2023-10-12 DIAGNOSIS — M79.89 LEG SWELLING: ICD-10-CM

## 2023-10-12 DIAGNOSIS — Z13.29 THYROID DISORDER SCREENING: ICD-10-CM

## 2023-10-12 DIAGNOSIS — L98.9 SKIN LESION: ICD-10-CM

## 2023-10-12 DIAGNOSIS — L68.0 FEMALE HIRSUTISM: ICD-10-CM

## 2023-10-12 DIAGNOSIS — E28.2 PCOS (POLYCYSTIC OVARIAN SYNDROME): ICD-10-CM

## 2023-10-12 DIAGNOSIS — E55.9 VITAMIN D DEFICIENCY: ICD-10-CM

## 2023-10-12 DIAGNOSIS — R21 RASH: Primary | ICD-10-CM

## 2023-10-12 DIAGNOSIS — E66.01 MORBID OBESITY WITH BMI OF 50.0-59.9, ADULT: ICD-10-CM

## 2023-10-12 DIAGNOSIS — Z13.0 SCREENING FOR BLOOD DISEASE: ICD-10-CM

## 2023-10-12 DIAGNOSIS — Z11.59 ENCOUNTER FOR HEPATITIS C SCREENING TEST FOR LOW RISK PATIENT: ICD-10-CM

## 2023-10-12 DIAGNOSIS — R03.0 ELEVATED BLOOD PRESSURE READING: Primary | ICD-10-CM

## 2023-10-12 DIAGNOSIS — R03.0 ELEVATED BLOOD PRESSURE READING: ICD-10-CM

## 2023-10-12 LAB
25(OH)D3 SERPL-MCNC: 29 NG/ML (ref 30–100)
ALBUMIN SERPL-MCNC: 4.6 G/DL (ref 3.5–5.2)
ALBUMIN/GLOB SERPL: 1.5 G/DL
ALP SERPL-CCNC: 73 U/L (ref 39–117)
ALT SERPL W P-5'-P-CCNC: 33 U/L (ref 1–33)
ANION GAP SERPL CALCULATED.3IONS-SCNC: 10 MMOL/L (ref 5–15)
AST SERPL-CCNC: 23 U/L (ref 1–32)
BILIRUB SERPL-MCNC: 0.3 MG/DL (ref 0–1.2)
BUN SERPL-MCNC: 13 MG/DL (ref 6–20)
BUN/CREAT SERPL: 16.3 (ref 7–25)
CALCIUM SPEC-SCNC: 10.2 MG/DL (ref 8.6–10.5)
CHLORIDE SERPL-SCNC: 104 MMOL/L (ref 98–107)
CHOLEST SERPL-MCNC: 136 MG/DL (ref 0–200)
CO2 SERPL-SCNC: 26 MMOL/L (ref 22–29)
CREAT SERPL-MCNC: 0.8 MG/DL (ref 0.57–1)
DEPRECATED RDW RBC AUTO: 37.5 FL (ref 37–54)
EGFRCR SERPLBLD CKD-EPI 2021: 104.4 ML/MIN/1.73
ERYTHROCYTE [DISTWIDTH] IN BLOOD BY AUTOMATED COUNT: 11.8 % (ref 12.3–15.4)
FOLATE SERPL-MCNC: 12.6 NG/ML (ref 4.78–24.2)
GLOBULIN UR ELPH-MCNC: 3.1 GM/DL
GLUCOSE SERPL-MCNC: 111 MG/DL (ref 65–99)
HBA1C MFR BLD: 5.5 % (ref 4.8–5.6)
HCT VFR BLD AUTO: 45 % (ref 34–46.6)
HCV AB SER DONR QL: NORMAL
HDLC SERPL-MCNC: 61 MG/DL (ref 40–60)
HGB BLD-MCNC: 15.3 G/DL (ref 12–15.9)
LDLC SERPL CALC-MCNC: 62 MG/DL (ref 0–100)
LDLC/HDLC SERPL: 1.03 {RATIO}
MCH RBC QN AUTO: 29.3 PG (ref 26.6–33)
MCHC RBC AUTO-ENTMCNC: 34 G/DL (ref 31.5–35.7)
MCV RBC AUTO: 86.2 FL (ref 79–97)
PLATELET # BLD AUTO: 355 10*3/MM3 (ref 140–450)
PMV BLD AUTO: 10.7 FL (ref 6–12)
POTASSIUM SERPL-SCNC: 4.9 MMOL/L (ref 3.5–5.2)
PROT SERPL-MCNC: 7.7 G/DL (ref 6–8.5)
RBC # BLD AUTO: 5.22 10*6/MM3 (ref 3.77–5.28)
SODIUM SERPL-SCNC: 140 MMOL/L (ref 136–145)
TRIGL SERPL-MCNC: 60 MG/DL (ref 0–150)
TSH SERPL DL<=0.05 MIU/L-ACNC: 1.49 UIU/ML (ref 0.27–4.2)
VIT B12 BLD-MCNC: 1239 PG/ML (ref 211–946)
VLDLC SERPL-MCNC: 13 MG/DL (ref 5–40)
WBC NRBC COR # BLD: 8.55 10*3/MM3 (ref 3.4–10.8)

## 2023-10-12 PROCEDURE — 83036 HEMOGLOBIN GLYCOSYLATED A1C: CPT | Performed by: NURSE PRACTITIONER

## 2023-10-12 PROCEDURE — 86803 HEPATITIS C AB TEST: CPT | Performed by: NURSE PRACTITIONER

## 2023-10-12 PROCEDURE — 36415 COLL VENOUS BLD VENIPUNCTURE: CPT | Performed by: NURSE PRACTITIONER

## 2023-10-12 PROCEDURE — 82607 VITAMIN B-12: CPT | Performed by: NURSE PRACTITIONER

## 2023-10-12 PROCEDURE — 82746 ASSAY OF FOLIC ACID SERUM: CPT | Performed by: NURSE PRACTITIONER

## 2023-10-12 PROCEDURE — 80061 LIPID PANEL: CPT | Performed by: NURSE PRACTITIONER

## 2023-10-12 PROCEDURE — 82306 VITAMIN D 25 HYDROXY: CPT | Performed by: NURSE PRACTITIONER

## 2023-10-12 PROCEDURE — 80050 GENERAL HEALTH PANEL: CPT | Performed by: NURSE PRACTITIONER

## 2023-10-12 RX ORDER — CETIRIZINE HYDROCHLORIDE 10 MG/1
10 TABLET ORAL DAILY
COMMUNITY

## 2023-10-12 RX ORDER — CLOBETASOL PROPIONATE 0.5 MG/G
1 CREAM TOPICAL 2 TIMES DAILY
Qty: 60 G | Refills: 1 | Status: SHIPPED | OUTPATIENT
Start: 2023-10-12

## 2023-10-12 RX ORDER — MULTIPLE VITAMINS W/ MINERALS TAB 9MG-400MCG
1 TAB ORAL DAILY
COMMUNITY

## 2023-10-12 NOTE — PROGRESS NOTES
Gynecologic Annual Exam Note        Gynecologic Exam (NGYN/Annual )        Subjective     HPI  Justice Rey is a 28 y.o.  female who presents for annual well woman exam as a new patient. There were no changes to her medical or surgical history since her last visit.. Patient reports problems with: none. Patient's last menstrual period was 10/03/2023.. Her periods occur every 25-35 days , lasting 3 days. The flow is light to moderate with occasional heavy days.. She reports dysmenorrhea is none. Partner Status: Marital Status: single.  She is not currently sexually active. STD testing recommendations have been explained to the patient and she does desire STD testing.    PCOS diagnosis in 2021 by former PCP= irregular periods and facial hair. She has also seen endocrinologist and took Ozempic, but has d/c due to insurance no longer covering. She denies any issues or concerns at today's visit.   Her periods are now regular.    Additional OB/GYN History   Current contraception: contraceptive methods: Abstinence  Desires to: do not start contraception  Thromboembolic Disease: family history  Age of menarche: 13    History of STD: no    Last Pap : 2020. Results: negative. HPV: unknown .          History of abnormal Pap smear: no  Gardasil status:completed  Family history of uterine, colon, breast, or ovarian cancer: no  Performs monthly Self-Breast Exam: no  Exercises Regularly: yes  Feelings of Anxiety or Depression: yes - anxiety  Tobacco Usage?: No       Current Outpatient Medications:     albuterol sulfate  (90 Base) MCG/ACT inhaler, Inhale 2 puffs Every 4 (Four) Hours As Needed for Wheezing or Shortness of Air., Disp: 18 g, Rfl: 11    cetirizine (zyrTEC) 10 MG tablet, Take 1 tablet by mouth Daily., Disp: , Rfl:     clobetasol (TEMOVATE) 0.05 % cream, Apply 1 application  topically to the appropriate area as directed 2 (Two) Times a Day., Disp: 60 g, Rfl: 1    Fluticasone-Salmeterol (ADVAIR/WIXELA) 500-50  MCG/ACT DISKUS, Inhale 1 puff 2 (Two) Times a Day for 30 days., Disp: 60 each, Rfl: 0    multivitamin with minerals (MULTIVITAMIN ADULT PO), Take 1 tablet by mouth Daily., Disp: , Rfl:     vitamin B-12 (CYANOCOBALAMIN) 1000 MCG tablet, Take 1 tablet by mouth Daily., Disp: , Rfl:      Patient denies the need for medication refills today.      Health Maintenance   Topic Date Due    Annual Gynecologic Pelvic and Breast Exam  Never done    BMI FOLLOWUP  Never done    HPV VACCINES (2 - 2-dose series) 01/08/2009    PAP SMEAR  02/25/2023    INFLUENZA VACCINE  Never done    COVID-19 Vaccine (3 - 2023-24 season) 09/01/2023    ANNUAL PHYSICAL  03/05/2030 (Originally 5/24/2017)    Pneumococcal Vaccine 0-64 (1 - PCV) 05/21/2030 (Originally 1/28/2003)    TDAP/TD VACCINES (3 - Td or Tdap) 04/24/2031    HEPATITIS C SCREENING  Completed    CHLAMYDIA SCREENING  Discontinued       Past Medical History:   Diagnosis Date    Allergic rhinitis     Ankle pain     Asthma     IgE-mediated allergic asthma       Body mass index 40.0-44.9, adult     BMI 42.5    Bronchitis     Chronic fatigue, unspecified     Encounter for screening for diabetes mellitus     Hematoma     is traumatic    Insomnia, unspecified     Pain in lower limb     Polycystic ovary syndrome April 2021    Officially diagnosed October 2021    Rhinitis     Ruptured varicose vein     RLE    Sprain of ankle     Upper respiratory infection     Urinary tract infection November 2021        Past Surgical History:   Procedure Laterality Date    INJECTION OF MEDICATION  05/01/2015    Depo Medrol (Methylprednisone) (2)       INJECTION OF MEDICATION  01/01/2016    INFLUENZA IMMUN ADMIN OR PREV RECV'D  (1)       INJECTION OF MEDICATION  06/28/2016    Kenalog (1)       TONSILLECTOMY         The additional following portions of the patient's history were reviewed and updated as appropriate: allergies, current medications, past family history, past medical history, past social history,  "past surgical history, and problem list.    Review of Systems   Constitutional: Negative.    HENT: Negative.     Eyes: Negative.    Respiratory: Negative.     Cardiovascular: Negative.    Gastrointestinal: Negative.    Endocrine: Negative.    Genitourinary: Negative.    Musculoskeletal: Negative.    Skin: Negative.    Allergic/Immunologic: Negative.    Neurological: Negative.    Hematological: Negative.    Psychiatric/Behavioral: Negative.          Anxiety         I have reviewed and agree with the HPI, ROS, and historical information as entered above. Milena Hussein Nuvia, APRN          Objective   /68   Ht 167.6 cm (66\")   Wt (!) 150 kg (329 lb 9.6 oz)   LMP 10/03/2023   BMI 53.20 kg/mý     Physical Exam  Vitals and nursing note reviewed. Exam conducted with a chaperone present.   Constitutional:       General: She is not in acute distress.     Appearance: Normal appearance. She is well-developed. She is obese. She is not ill-appearing.   Neck:      Thyroid: No thyroid mass or thyromegaly.   Pulmonary:      Effort: Pulmonary effort is normal. No respiratory distress or retractions.   Chest:      Chest wall: No mass.   Breasts:     Right: Normal. No mass, nipple discharge, skin change or tenderness.      Left: Normal. No mass, nipple discharge, skin change or tenderness.   Abdominal:      General: There is no distension.      Palpations: Abdomen is soft. Abdomen is not rigid. There is no mass.      Tenderness: There is no abdominal tenderness. There is no guarding or rebound.      Hernia: No hernia is present. There is no hernia in the left inguinal area or right inguinal area.   Genitourinary:     General: Normal vulva.      Labia:         Right: No rash, tenderness or lesion.         Left: No rash, tenderness or lesion.       Vagina: Normal. No vaginal discharge or lesions.      Cervix: Normal.      Uterus: Normal. Not enlarged, not fixed and not tender.       Adnexa: Right adnexa normal and left adnexa " normal.        Right: No mass or tenderness.          Left: No mass or tenderness.        Rectum: No external hemorrhoid.   Musculoskeletal:      Cervical back: No muscular tenderness.   Skin:     General: Skin is warm and dry.   Neurological:      Mental Status: She is alert and oriented to person, place, and time.   Psychiatric:         Mood and Affect: Mood normal.         Behavior: Behavior normal.            Assessment and Plan    Problem List Items Addressed This Visit          Endocrine and Metabolic    Morbid obesity with BMI of 50.0-59.9, adult     Other Visit Diagnoses       Women's annual routine gynecological examination    -  Primary    Relevant Orders    LIQUID-BASED PAP SMEAR WITH HPV GENOTYPING REGARDLESS OF INTERPRETATION (FREDO,COR,MAD)    Female hirsutism        Irregular periods        PCOS (polycystic ovarian syndrome)        Pap test, as part of routine gynecological examination                GYN annual well woman exam.   Reviewed pap guidelines.   Reviewed BMI and weight loss as preventative health measures.   Reviewed exercise as a preventative health measures.   Reccommended Flu Vaccine in Fall of each year.  RTC in 1 year or PRN with problems  Return in about 1 year (around 10/12/2024), or if symptoms worsen or fail to improve, for Annual physical.  D/w pt option of restarting metformin XL gradually increasing dose as tolerated.  She saw a new PCP earlier today and had labs drawn.  She is considering weight loss surgery.      Milena Pedersen, APRN  10/12/2023

## 2023-10-12 NOTE — PROGRESS NOTES
Subjective   Chief Complaint   Patient presents with    Establish Care    Rash     Arms and legs - itchy and redness. Starting to clear up    Dermatology Referral     Place on nose      Justice Rey is a 28 y.o. female.     The patient is here today to establish care for a rash on arms and legs. She has a placed on her nose. She would like a dermatology referral. She has a history of asthma and elevated cholesterol.    The patient had a head injury and had some vision loss. She would have spells where she was either sitting or doing something and she would either search and not be able to see her peripheral or sometimes it would be spotty. She has seen her eye doctor for it several times. She saw her primary care physician and they both said that it seemed fine. She has not had anything in the past year. Her primary care physician referred her to a neurologist. The appointment was so far out at that point. She had not had anything in 6 months and everything is okay now.    The patient has had labs done at her work. She had a wellness scan for biometric screening done in 08/2023 and they were all under. She has not had anything sugary to drink.    The patient allergies have been rough this year.    The patient's last Pap smear was 3 years ago and she has another one scheduled today.    The patient started getting a rash on her nose. They have pretty much gone away now. She  has never had anything like that before. There was one on her stomach. She put over the counter hydrocortisone cream on it. She was using a body wash with acid in it. She stopped using it. She is not sure if that is what was causing it. She had a different kind of rash on her stomach. Her sister is a nurse and thought it just looked like a deep rash.    The patient has a spot on her nose for a while. When it first came up, she picked at it and it bleeds a lot.    The patient has been experiencing some leg edema. She has been walking more. Last  week for several days, her legs and ankles were really swollen. She did not drink as much water as she normally does last week. She denies any discoloration in her legs. She denies any pain.    The patient does not keep checking her blood pressure at home. She has gained about 20 pounds in the past few months. She does not have a relationship with her father or his son and family. She does not know her grandparents.    I have reviewed the following portions of the patient's history and confirmed they are accurate: allergies, current medications, past family history, past medical history, past social history, past surgical history, and problem list    Review of Systems  Pertinent items are noted in HPI.     Current Outpatient Medications on File Prior to Visit   Medication Sig    albuterol sulfate  (90 Base) MCG/ACT inhaler Inhale 2 puffs Every 4 (Four) Hours As Needed for Wheezing or Shortness of Air.    cetirizine (zyrTEC) 10 MG tablet Take 1 tablet by mouth Daily.    Fluticasone-Salmeterol (ADVAIR/WIXELA) 500-50 MCG/ACT DISKUS Inhale 1 puff 2 (Two) Times a Day for 30 days.    vitamin B-12 (CYANOCOBALAMIN) 1000 MCG tablet Take 1 tablet by mouth Daily.    [DISCONTINUED] benzonatate (TESSALON) 200 MG capsule Take 1 capsule by mouth 3 (Three) Times a Day As Needed for Cough.    [DISCONTINUED] methylPREDNISolone (MEDROL) 4 MG dose pack Take as directed on package instructions.    [DISCONTINUED] ondansetron ODT (ZOFRAN-ODT) 4 MG disintegrating tablet Dissolve 1 tablet on the tongue Every 8 (Eight) Hours As Needed for Nausea or Vomiting.    [DISCONTINUED] Semaglutide, 1 MG/DOSE, (Ozempic, 1 MG/DOSE,) 4 MG/3ML solution pen-injector Inject 1 mg under the skin into the appropriate area as directed 1 (One) Time Per Week.     No current facility-administered medications on file prior to visit.       Objective   Vitals:    10/12/23 0838   BP: 138/94   Pulse: 77   Resp: 16   Temp: 98.1 øF (36.7 øC)   TempSrc: Tympanic  "  SpO2: 98%   Weight: (!) 148 kg (327 lb)   Height: 167.6 cm (66\")     Body mass index is 52.78 kg/mý.    Physical Exam  Vitals reviewed.   Constitutional:       Appearance: Normal appearance. She is well-developed.   HENT:      Head: Normocephalic and atraumatic.      Nose: Nose normal.   Eyes:      General: Lids are normal.      Conjunctiva/sclera: Conjunctivae normal.      Pupils: Pupils are equal, round, and reactive to light.   Neck:      Thyroid: No thyromegaly.      Trachea: Trachea normal.   Cardiovascular:      Rate and Rhythm: Normal rate and regular rhythm.      Heart sounds: Normal heart sounds.   Pulmonary:      Effort: Pulmonary effort is normal. No respiratory distress.      Breath sounds: Normal breath sounds.   Musculoskeletal:      Right lower leg: Edema present.      Left lower leg: Edema present.   Skin:     General: Skin is warm and dry.      Comments: Dry red patches on forearms bilaterally. Rough skin lesion on bridge of nose.    Neurological:      Mental Status: She is alert and oriented to person, place, and time.      GCS: GCS eye subscore is 4. GCS verbal subscore is 5. GCS motor subscore is 6.   Psychiatric:         Attention and Perception: Attention normal.         Mood and Affect: Mood normal.         Speech: Speech normal.         Behavior: Behavior normal. Behavior is cooperative.         Thought Content: Thought content normal.         Assessment & Plan   Problem List Items Addressed This Visit       Leg swelling    Elevated blood pressure reading     Other Visit Diagnoses       Rash    -  Primary    Relevant Medications    clobetasol (TEMOVATE) 0.05 % cream    Other Relevant Orders    Ambulatory Referral to Dermatology (Completed)    Skin lesion        Relevant Medications    clobetasol (TEMOVATE) 0.05 % cream    Other Relevant Orders    Ambulatory Referral to Dermatology (Completed)    Screening for blood disease        Relevant Orders    CBC (No Diff)    Comprehensive Metabolic " Panel    Lipid Panel    Hemoglobin A1c    TSH Rfx On Abnormal To Free T4    Vitamin B12 & Folate    Vitamin D,25-Hydroxy    Hepatitis C Antibody    Thyroid disorder screening        Relevant Orders    TSH Rfx On Abnormal To Free T4    Lipid screening        Relevant Orders    Lipid Panel    Encounter for vitamin deficiency screening        Relevant Orders    Vitamin B12 & Folate    Vitamin D,25-Hydroxy    Screening for diabetes mellitus        Relevant Orders    Hemoglobin A1c    Encounter for hepatitis C screening test for low risk patient        Relevant Orders    Hepatitis C Antibody    Vitamin D deficiency        Relevant Orders    Vitamin D,25-Hydroxy               Current Outpatient Medications:     albuterol sulfate  (90 Base) MCG/ACT inhaler, Inhale 2 puffs Every 4 (Four) Hours As Needed for Wheezing or Shortness of Air., Disp: 18 g, Rfl: 11    cetirizine (zyrTEC) 10 MG tablet, Take 1 tablet by mouth Daily., Disp: , Rfl:     Fluticasone-Salmeterol (ADVAIR/WIXELA) 500-50 MCG/ACT DISKUS, Inhale 1 puff 2 (Two) Times a Day for 30 days., Disp: 60 each, Rfl: 0    vitamin B-12 (CYANOCOBALAMIN) 1000 MCG tablet, Take 1 tablet by mouth Daily., Disp: , Rfl:     clobetasol (TEMOVATE) 0.05 % cream, Apply 1 application  topically to the appropriate area as directed 2 (Two) Times a Day., Disp: 60 g, Rfl: 1       1. Rash.  - New, unstable.  - Start clobetasol cream.  - Referring to dermatology for consult.    2. Skin lesion.  - New, unstable.  - Referring to dermatology for consult    3. Leg edema.  - New, unstable.  - Follow low salt diet.  - Increase water intake.  - Wear compression stockings.  - Elevate legs as needed.  - If no improvement in symptoms, she will follow up and consider starting low dose HCTZ.  - Completing labs today including CBC, and CMP.    4. Elevated blood pressure reading.  - Chronic, unstable.  - Encouraged patient to purchase blood pressure cuff and monitor at home.  - If averaging greater  than 140/90 mmHg, she will follow up.  - Follow heart healthy, low cholesterol, low fat diet.  - Complete labs today including CBC, CMP, and fasting lipid panel.      Plan of care reviewed with the patient at the conclusion of today's visit.  Education was provided regarding diagnosis, management, and any prescribed or recommended OTC medications.  Patient verbalized understanding of and agreement with management plan.     Return if symptoms worsen or fail to improve.          Transcribed from ambient dictation for NIHARIKA Johnston by Milagro Gresham.  10/12/23   10:34 EDT    Patient or patient representative verbalized consent to the visit recording.  I have personally performed the services described in this document as transcribed by the above individual, and it is both accurate and complete.

## 2023-10-17 ENCOUNTER — PATIENT ROUNDING (BHMG ONLY) (OUTPATIENT)
Dept: INTERNAL MEDICINE | Facility: CLINIC | Age: 28
End: 2023-10-17
Payer: COMMERCIAL

## 2023-10-17 LAB — REF LAB TEST METHOD: NORMAL

## 2023-10-17 NOTE — PROGRESS NOTES
A  my chart message has been sent to the patient for patient rounding [place] [facility] [place]

## 2023-10-25 ENCOUNTER — TELEPHONE (OUTPATIENT)
Dept: OBSTETRICS AND GYNECOLOGY | Facility: CLINIC | Age: 28
End: 2023-10-25

## 2023-12-22 ENCOUNTER — OFFICE VISIT (OUTPATIENT)
Dept: OBSTETRICS AND GYNECOLOGY | Facility: CLINIC | Age: 28
End: 2023-12-22
Payer: COMMERCIAL

## 2023-12-22 VITALS
SYSTOLIC BLOOD PRESSURE: 118 MMHG | HEIGHT: 66 IN | BODY MASS INDEX: 47.09 KG/M2 | DIASTOLIC BLOOD PRESSURE: 78 MMHG | WEIGHT: 293 LBS

## 2023-12-22 DIAGNOSIS — R87.610 ASCUS WITH POSITIVE HIGH RISK HPV CERVICAL: Primary | ICD-10-CM

## 2023-12-22 DIAGNOSIS — Z76.89 ENCOUNTER FOR BIOPSY: ICD-10-CM

## 2023-12-22 DIAGNOSIS — R87.810 ASCUS WITH POSITIVE HIGH RISK HPV CERVICAL: Primary | ICD-10-CM

## 2023-12-22 LAB
B-HCG UR QL: NEGATIVE
EXPIRATION DATE: NORMAL
INTERNAL NEGATIVE CONTROL: NORMAL
INTERNAL POSITIVE CONTROL: NORMAL
Lab: NORMAL

## 2023-12-22 NOTE — PROGRESS NOTES
"     Colposcopy Procedure Note        Procedures    Indications: Justice Rey is a 28 y.o. female, No obstetric history on file., whose Patient's last menstrual period was 12/17/2023 (exact date)..  She presents for follow up for evaluation of an abnormal PAP smear that showed ASCUS with POSITIVE high risk HPV. Prior cervical treatment includes: no treatment. She understands the need for the procedure and is aware of the complications, including post-colposcopic vaginal bleeding, vaginal leukorrhea or cervicitis.  She is aware she may experience discomfort.  After being presented with the risk, benefits, and alternatives the patient wished to proceed.      Urine pregnancy test done in the office today was Negative.      The patient has had Gardasil.  She is not a smoker.      Procedure Details   The risks and benefits of the procedure and Verbal informed consent obtained. Time out: immediate members of the procedure team and patient agree to the following: correct patient, correct site, correct procedure to be performed. Kala Farias MD      She was positioned in the dorsal lithotomy position and a speculum was inserted into the vagina and excellent visualization of cervix achieved, cervix swabbed x 3 with acetic acid solution. The transformation zone was completely visualized.  A cervical biopsy was obtained at 4 and 8 o'clock.  An endocervical curettage was performed.  This colposcopy was satisfactory.     Findings:  The procedure was notable for:  Physical Exam  /78   Ht 167.6 cm (66\")   Wt (!) 152 kg (335 lb)   LMP 12/17/2023 (Exact Date)   BMI 54.07 kg/m²       Specimens: cervical biopsies  Specimens labelled and sent to Pathology.    Complications: none.    The patient tolerated the procedure well and with mild discomfort and bleeding.    Assessment and Plan    Problem List Items Addressed This Visit    None  Visit Diagnoses       ASCUS with positive high risk HPV cervical    -  Primary    Relevant " Orders    POC Pregnancy, Urine (Completed)            Will base further treatment on Pathology findings.  Treatment options discussed with patient.  Post biopsy instructions given to patient.      Kala Farias MD  12/22/2023

## 2023-12-26 LAB — REF LAB TEST METHOD: NORMAL

## 2024-01-04 ENCOUNTER — TELEPHONE (OUTPATIENT)
Dept: OBSTETRICS AND GYNECOLOGY | Facility: CLINIC | Age: 29
End: 2024-01-04
Payer: COMMERCIAL

## 2024-01-04 NOTE — TELEPHONE ENCOUNTER
Returning Faye's call from yesterday. Requesting a call back at [phone] extension [phone] to reach her directly

## 2024-02-23 NOTE — PROGRESS NOTES
Subjective   Justice Rey is a 26 y.o. female.  Evaluation possible metabolic syndrome.  Lab work did reveal elevated testosterone.  This combined with her hirsutism and weight probably need to ask endocrinology to evaluate for treatment options.  Continues on medications without problems.  History noted.    History of Present Illness   HPI    The following portions of the patient's history were reviewed and updated as appropriate: allergies, current medications, past family history, past medical history, past social history, past surgical history and problem list.    Review of Systems  Review of Systems   Constitutional: Negative for activity change, appetite change, fatigue and unexpected weight change.   HENT: Negative for trouble swallowing and voice change.    Eyes: Negative for redness and visual disturbance.   Respiratory: Negative for cough and wheezing.    Cardiovascular: Negative for chest pain and palpitations.   Gastrointestinal: Negative for abdominal pain, constipation, diarrhea, nausea and vomiting.   Genitourinary: Negative for urgency.   Musculoskeletal: Negative for joint swelling.   Neurological: Negative for syncope and headaches.   Hematological: Negative for adenopathy.   Psychiatric/Behavioral: Negative for sleep disturbance.       Objective   Physical Exam  Physical Exam  Vitals reviewed.   Constitutional:       Appearance: She is obese.   Neurological:      Mental Status: She is alert.   Psychiatric:         Mood and Affect: Mood normal.         Thought Content: Thought content normal.         Judgment: Judgment normal.       Results for orders placed or performed in visit on 03/17/21   CBC (No Diff)    Specimen: Blood   Result Value Ref Range    WBC 9.29 3.40 - 10.80 10*3/mm3    RBC 5.41 (H) 3.77 - 5.28 10*6/mm3    Hemoglobin 15.6 12.0 - 15.9 g/dL    Hematocrit 46.0 34.0 - 46.6 %    MCV 85.0 79.0 - 97.0 fL    MCH 28.8 26.6 - 33.0 pg    MCHC 33.9 31.5 - 35.7 g/dL    RDW 12.6 12.3 - 15.4 %  "   RDW-SD 38.6 37.0 - 54.0 fl    MPV 11.2 6.0 - 12.0 fL    Platelets 336 140 - 450 10*3/mm3   Comprehensive Metabolic Panel    Specimen: Blood   Result Value Ref Range    Glucose 106 (H) 65 - 99 mg/dL    BUN 8 6 - 20 mg/dL    Creatinine 0.72 0.57 - 1.00 mg/dL    Sodium 140 136 - 145 mmol/L    Potassium 4.3 3.5 - 5.2 mmol/L    Chloride 101 98 - 107 mmol/L    CO2 28.5 22.0 - 29.0 mmol/L    Calcium 9.6 8.6 - 10.5 mg/dL    Total Protein 7.9 6.0 - 8.5 g/dL    Albumin 4.60 3.50 - 5.20 g/dL    ALT (SGPT) 40 (H) 1 - 33 U/L    AST (SGOT) 32 1 - 32 U/L    Alkaline Phosphatase 73 39 - 117 U/L    Total Bilirubin 0.6 0.0 - 1.2 mg/dL    eGFR Non African Amer 100 >60 mL/min/1.73    Globulin 3.3 gm/dL    A/G Ratio 1.4 g/dL    BUN/Creatinine Ratio 11.1 7.0 - 25.0    Anion Gap 10.5 5.0 - 15.0 mmol/L   Hemoglobin A1c    Specimen: Blood   Result Value Ref Range    Hemoglobin A1C 5.44 4.80 - 5.60 %   Hepatitis C Antibody    Specimen: Blood   Result Value Ref Range    Hepatitis C Ab Non-Reactive Non-Reactive   T4, Free    Specimen: Blood   Result Value Ref Range    Free T4 1.33 0.93 - 1.70 ng/dL   TSH    Specimen: Blood   Result Value Ref Range    TSH 1.250 0.270 - 4.200 uIU/mL   Testosterone (Free & Total), LC / MS    Specimen: Blood   Result Value Ref Range    Testosterone, Total 68.0 (H) 10.0 - 55.0 ng/dL    Testosterone, Free 4.9 (H) 0.0 - 4.2 pg/mL   Lipid Panel With LDL / HDL Ratio    Specimen: Blood   Result Value Ref Range    Total Cholesterol 122 0 - 200 mg/dL    Triglycerides 96 0 - 150 mg/dL    HDL Cholesterol 55 40 - 60 mg/dL    LDL Cholesterol  49 0 - 100 mg/dL    VLDL Cholesterol 18 5 - 40 mg/dL    LDL/HDL Ratio 0.87          Visit Vitals  /84   Ht 167.6 cm (66\")   Wt (!) 144 kg (317 lb)   LMP 02/22/2021 (Exact Date)   BMI 51.17 kg/m²     Body mass index is 51.17 kg/m².      Assessment/Plan   Diagnoses and all orders for this visit:    1. Morbid (severe) obesity due to excess calories (CMS/HCC) (Primary)  -     " Ambulatory Referral to Endocrinology    2. 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 Patient seen this morning with wife at bedside. States he worked with PT and was able to walk to the bathroom. Has two drains in place. REports pain is there - but current regimen is helping. No other acute complaints.    MEDICATIONS:  MEDICATIONS  (STANDING):  acetaminophen     Tablet .. 1000 milliGRAM(s) Oral every 8 hours  ceFAZolin   IVPB 2000 milliGRAM(s) IV Intermittent every 8 hours  influenza   Vaccine 0.5 milliLiter(s) IntraMuscular once  ondansetron Injectable 4 milliGRAM(s) IV Push every 6 hours  polyethylene glycol 3350 17 Gram(s) Oral daily  senna 2 Tablet(s) Oral at bedtime  sodium chloride 0.9%. 1000 milliLiter(s) (100 mL/Hr) IV Continuous <Continuous>    MEDICATIONS  (PRN):  HYDROmorphone  Injectable 0.5 milliGRAM(s) IV Push every 4 hours PRN sever breakthrough pain  metoclopramide Injectable 10 milliGRAM(s) IV Push every 8 hours PRN Nausea and/or Vomiting (2nd line)  traMADol 25 milliGRAM(s) Oral every 4 hours PRN Moderate Pain (4 - 6)  traMADol 50 milliGRAM(s) Oral every 4 hours PRN Severe Pain (7 - 10)      Allergies    No Known Allergies    Intolerances        OBJECTIVE:  Vital Signs Last 24 Hrs  T(C): 36.9 (23 Feb 2024 09:28), Max: 36.9 (23 Feb 2024 09:28)  T(F): 98.4 (23 Feb 2024 09:28), Max: 98.4 (23 Feb 2024 09:28)  HR: 70 (23 Feb 2024 09:28) (45 - 70)  BP: 114/75 (23 Feb 2024 09:28) (97/50 - 124/75)  BP(mean): 66 (23 Feb 2024 05:00) (66 - 98)  RR: 17 (23 Feb 2024 09:28) (14 - 18)  SpO2: 95% (23 Feb 2024 09:28) (94% - 97%)    Parameters below as of 23 Feb 2024 09:28  Patient On (Oxygen Delivery Method): room air      I&O's Summary    22 Feb 2024 07:01  -  23 Feb 2024 07:00  --------------------------------------------------------  IN: 0 mL / OUT: 295 mL / NET: -295 mL        PHYSICAL EXAM:  Gen: appears stated age, resting comfortably, NAD  HEENT: NCAT, MMM  Neck: supple  CV: RRR, no m/r/g, peripheral pulses 2+  Pulm: CTAB, no increased work of breathing, no rales/rhonchi  Abd: soft, ND, NT, no rebound or guarding  Skin: warm and dry  Ext: non-tender, no edema  Neuro: AOx3, speaking in full sentences  Psych: affect and behavior appropriate, pleasant at time of interview    LABS:                        13.0   13.12 )-----------( 200      ( 23 Feb 2024 06:43 )             37.3     02-23    138  |  106  |  13  ----------------------------<  109<H>  3.9   |  26  |  0.90    Ca    8.8      23 Feb 2024 06:43  Phos  3.9     02-23  Mg     1.7     02-23          CAPILLARY BLOOD GLUCOSE        Urinalysis Basic - ( 23 Feb 2024 06:43 )    Color: x / Appearance: x / SG: x / pH: x  Gluc: 109 mg/dL / Ketone: x  / Bili: x / Urobili: x   Blood: x / Protein: x / Nitrite: x   Leuk Esterase: x / RBC: x / WBC x   Sq Epi: x / Non Sq Epi: x / Bacteria: x        MICRODATA:      RADIOLOGY/OTHER STUDIES:

## 2024-12-19 ENCOUNTER — LAB (OUTPATIENT)
Age: 29
End: 2024-12-19
Payer: COMMERCIAL

## 2024-12-19 ENCOUNTER — OFFICE VISIT (OUTPATIENT)
Age: 29
End: 2024-12-19
Payer: COMMERCIAL

## 2024-12-19 VITALS
HEART RATE: 88 BPM | SYSTOLIC BLOOD PRESSURE: 142 MMHG | HEIGHT: 65 IN | BODY MASS INDEX: 48.82 KG/M2 | WEIGHT: 293 LBS | DIASTOLIC BLOOD PRESSURE: 84 MMHG | OXYGEN SATURATION: 98 %

## 2024-12-19 DIAGNOSIS — L50.9 HIVES: ICD-10-CM

## 2024-12-19 DIAGNOSIS — R03.0 ELEVATED BLOOD PRESSURE READING: ICD-10-CM

## 2024-12-19 DIAGNOSIS — E66.01 MORBID OBESITY WITH BMI OF 50.0-59.9, ADULT: ICD-10-CM

## 2024-12-19 DIAGNOSIS — E28.2 PCOS (POLYCYSTIC OVARIAN SYNDROME): ICD-10-CM

## 2024-12-19 DIAGNOSIS — Z11.3 SCREENING FOR STD (SEXUALLY TRANSMITTED DISEASE): ICD-10-CM

## 2024-12-19 DIAGNOSIS — Z00.00 HEALTHCARE MAINTENANCE: ICD-10-CM

## 2024-12-19 DIAGNOSIS — R87.612 LGSIL ON PAP SMEAR OF CERVIX: ICD-10-CM

## 2024-12-19 DIAGNOSIS — R79.89 LOW VITAMIN D LEVEL: ICD-10-CM

## 2024-12-19 DIAGNOSIS — E66.01 MORBID OBESITY WITH BMI OF 50.0-59.9, ADULT: Primary | ICD-10-CM

## 2024-12-19 DIAGNOSIS — Z23 ENCOUNTER FOR IMMUNIZATION: ICD-10-CM

## 2024-12-19 DIAGNOSIS — R87.610 ASCUS WITH POSITIVE HIGH RISK HPV CERVICAL: ICD-10-CM

## 2024-12-19 DIAGNOSIS — R87.810 ASCUS WITH POSITIVE HIGH RISK HPV CERVICAL: ICD-10-CM

## 2024-12-19 PROBLEM — M79.89 LEG SWELLING: Status: RESOLVED | Noted: 2023-10-12 | Resolved: 2024-12-19

## 2024-12-19 LAB
25(OH)D3 SERPL-MCNC: 23.1 NG/ML (ref 30–100)
ALBUMIN SERPL-MCNC: 4.2 G/DL (ref 3.5–5.2)
ALBUMIN/GLOB SERPL: 1.3 G/DL
ALP SERPL-CCNC: 71 U/L (ref 39–117)
ALT SERPL W P-5'-P-CCNC: 33 U/L (ref 1–33)
ANION GAP SERPL CALCULATED.3IONS-SCNC: 8.7 MMOL/L (ref 5–15)
AST SERPL-CCNC: 21 U/L (ref 1–32)
BILIRUB BLD-MCNC: NEGATIVE MG/DL
BILIRUB SERPL-MCNC: 0.5 MG/DL (ref 0–1.2)
BUN SERPL-MCNC: 10 MG/DL (ref 6–20)
BUN/CREAT SERPL: 12.3 (ref 7–25)
C3 SERPL-MCNC: 185 MG/DL (ref 82–167)
C4 SERPL-MCNC: 35 MG/DL (ref 14–44)
CALCIUM SPEC-SCNC: 9.2 MG/DL (ref 8.6–10.5)
CHLORIDE SERPL-SCNC: 105 MMOL/L (ref 98–107)
CHOLEST SERPL-MCNC: 125 MG/DL (ref 0–200)
CLARITY, POC: CLEAR
CO2 SERPL-SCNC: 24.3 MMOL/L (ref 22–29)
COLOR UR: NORMAL
CREAT SERPL-MCNC: 0.81 MG/DL (ref 0.57–1)
DEPRECATED RDW RBC AUTO: 39.7 FL (ref 37–54)
EGFRCR SERPLBLD CKD-EPI 2021: 102.2 ML/MIN/1.73
ERYTHROCYTE [DISTWIDTH] IN BLOOD BY AUTOMATED COUNT: 12.3 % (ref 12.3–15.4)
EXPIRATION DATE: NORMAL
GLOBULIN UR ELPH-MCNC: 3.2 GM/DL
GLUCOSE SERPL-MCNC: 103 MG/DL (ref 65–99)
GLUCOSE UR STRIP-MCNC: NEGATIVE MG/DL
HBA1C MFR BLD: 5.3 % (ref 4.8–5.6)
HCT VFR BLD AUTO: 44.5 % (ref 34–46.6)
HDLC SERPL-MCNC: 48 MG/DL (ref 40–60)
HGB BLD-MCNC: 14.9 G/DL (ref 12–15.9)
HIV 1+2 AB+HIV1 P24 AG SERPL QL IA: NORMAL
KETONES UR QL: NEGATIVE
LDLC SERPL CALC-MCNC: 64 MG/DL (ref 0–100)
LDLC/HDLC SERPL: 1.35 {RATIO}
LEUKOCYTE EST, POC: NEGATIVE
Lab: NORMAL
MCH RBC QN AUTO: 29.6 PG (ref 26.6–33)
MCHC RBC AUTO-ENTMCNC: 33.5 G/DL (ref 31.5–35.7)
MCV RBC AUTO: 88.5 FL (ref 79–97)
NITRITE UR-MCNC: NEGATIVE MG/ML
PH UR: 6 [PH] (ref 5–8)
PLATELET # BLD AUTO: 348 10*3/MM3 (ref 140–450)
PMV BLD AUTO: 10.7 FL (ref 6–12)
POTASSIUM SERPL-SCNC: 4.1 MMOL/L (ref 3.5–5.2)
PROT SERPL-MCNC: 7.4 G/DL (ref 6–8.5)
PROT UR STRIP-MCNC: NEGATIVE MG/DL
RBC # BLD AUTO: 5.03 10*6/MM3 (ref 3.77–5.28)
RBC # UR STRIP: NEGATIVE /UL
SODIUM SERPL-SCNC: 138 MMOL/L (ref 136–145)
SP GR UR: 1.02 (ref 1–1.03)
TRIGL SERPL-MCNC: 62 MG/DL (ref 0–150)
TSH SERPL DL<=0.05 MIU/L-ACNC: 1.21 UIU/ML (ref 0.27–4.2)
UROBILINOGEN UR QL: NORMAL
VLDLC SERPL-MCNC: 13 MG/DL (ref 5–40)
WBC NRBC COR # BLD AUTO: 8.92 10*3/MM3 (ref 3.4–10.8)

## 2024-12-19 PROCEDURE — 82306 VITAMIN D 25 HYDROXY: CPT | Performed by: STUDENT IN AN ORGANIZED HEALTH CARE EDUCATION/TRAINING PROGRAM

## 2024-12-19 PROCEDURE — 80061 LIPID PANEL: CPT | Performed by: STUDENT IN AN ORGANIZED HEALTH CARE EDUCATION/TRAINING PROGRAM

## 2024-12-19 PROCEDURE — 83036 HEMOGLOBIN GLYCOSYLATED A1C: CPT | Performed by: STUDENT IN AN ORGANIZED HEALTH CARE EDUCATION/TRAINING PROGRAM

## 2024-12-19 PROCEDURE — G0432 EIA HIV-1/HIV-2 SCREEN: HCPCS | Performed by: STUDENT IN AN ORGANIZED HEALTH CARE EDUCATION/TRAINING PROGRAM

## 2024-12-19 PROCEDURE — 36415 COLL VENOUS BLD VENIPUNCTURE: CPT | Performed by: STUDENT IN AN ORGANIZED HEALTH CARE EDUCATION/TRAINING PROGRAM

## 2024-12-19 PROCEDURE — 99214 OFFICE O/P EST MOD 30 MIN: CPT | Performed by: STUDENT IN AN ORGANIZED HEALTH CARE EDUCATION/TRAINING PROGRAM

## 2024-12-19 PROCEDURE — 86160 COMPLEMENT ANTIGEN: CPT | Performed by: STUDENT IN AN ORGANIZED HEALTH CARE EDUCATION/TRAINING PROGRAM

## 2024-12-19 PROCEDURE — 99395 PREV VISIT EST AGE 18-39: CPT | Performed by: STUDENT IN AN ORGANIZED HEALTH CARE EDUCATION/TRAINING PROGRAM

## 2024-12-19 PROCEDURE — 93000 ELECTROCARDIOGRAM COMPLETE: CPT | Performed by: STUDENT IN AN ORGANIZED HEALTH CARE EDUCATION/TRAINING PROGRAM

## 2024-12-19 PROCEDURE — 86592 SYPHILIS TEST NON-TREP QUAL: CPT | Performed by: STUDENT IN AN ORGANIZED HEALTH CARE EDUCATION/TRAINING PROGRAM

## 2024-12-19 PROCEDURE — 80050 GENERAL HEALTH PANEL: CPT | Performed by: STUDENT IN AN ORGANIZED HEALTH CARE EDUCATION/TRAINING PROGRAM

## 2024-12-19 PROCEDURE — 90656 IIV3 VACC NO PRSV 0.5 ML IM: CPT | Performed by: STUDENT IN AN ORGANIZED HEALTH CARE EDUCATION/TRAINING PROGRAM

## 2024-12-19 PROCEDURE — 81003 URINALYSIS AUTO W/O SCOPE: CPT | Performed by: STUDENT IN AN ORGANIZED HEALTH CARE EDUCATION/TRAINING PROGRAM

## 2024-12-19 PROCEDURE — 90471 IMMUNIZATION ADMIN: CPT | Performed by: STUDENT IN AN ORGANIZED HEALTH CARE EDUCATION/TRAINING PROGRAM

## 2024-12-19 NOTE — PROGRESS NOTES
Annual Health Maintenance Exam      HPI       History of Present Illness  29-year-old female here for annual exam and to establish care.    Recurrent pruritic, painful rashes since October on arms, elbows, hand, and back, spreading and lasting approximately 4 days per rash. Suspects stress correlation. Prescribed prednisone urgent care but did not take it.. History of outdoor allergies (tested in 2015) and year-round congestion. No autoimmune workup. . Minimal relief from hydrocortisone cream and antihistamines (Benadryl, Zyrtec).    Intermittent elevated blood pressure, notably post-head injury. No other hypertension instances.    Fatigue post-work, possibly due to work stress. No anxiety or depression symptoms. 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 Enjoys craRestleting, [employer]-making, family time.   Recent dietary indiscretions but generally balanced diet. Occasional caffeine, OTC allergy meds. Walks ~3000 steps daily. Upcoming dental appointment in January, regular visits every 6-8 months. Recent eye exam, no vision/hearing concerns.     Inconsistent seatbelt use due to sensory issues, no texting while driving. Never smoked, social alcohol (~2 drinks), infrequent marijuana use. Not sexually active currently, requests STD testing. Never received influenza vaccine.     History of abnormal Pap smear (ASCUS with positive HPV) and cervical biopsy (LSIL), follow-up due. Mammogram for cyst in the past. No personal history of pancreatitis or family history of thyroid issues. No leg swelling, fever, chills, chest pain, or shortness of breath.    Diagnosed with PCOS, 45 days since last menstrual period, typically 35-40 day cycles.    Taking compounded semaglutide 1 mg weekly from weight loss clinic in [place], lost 10-15 pounds, daily diarrhea attributed to semaglutide.    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 She enjoys cramoziying, candle-making, and spending time with family. She has never smoked and drinks alcohol socially, consuming about two drinks when she goes out. She uses marijuana infrequently, approximately every few months.    FAMILY HISTORY  She is not aware of any family history of breast cancer, ovarian cancer, colon cancer, pancreatitis, or thyroid issues. Her grandmother had a hysterectomy, possibly for endometriosis.    ALLERGIES  The patient has outdoor allergies, tested high in 2015.    MEDICATIONS  Current: Hydrocortisone cream, Zyrtec, compounded semaglutide.      IMMUNIZATIONS  She has never received the influenza vaccine.          Mood:  PHQ-2 Depression Screening  Little interest or pleasure in doing things? Several days   Feeling down, depressed, or hopeless? Not at all   PHQ-2 Total Score 1       Past Medical History:  Patient Active Problem List   Diagnosis    Moderate persistent asthma    Elevated blood pressure reading    Morbid obesity with BMI of 50.0-59.9, adult    PCOS (polycystic ovarian syndrome)    ASCUS with positive high risk HPV cervical    LGSIL on Pap smear of cervix        Allergies:  No Known Allergies     Medications:    Current Outpatient Medications:     albuterol sulfate  (90 Base) MCG/ACT inhaler, Inhale 2 puffs Every 4 (Four) Hours As Needed for Wheezing or Shortness of Air., Disp: 18 g, Rfl: 11    cetirizine (zyrTEC) 10 MG tablet, Take 1 tablet by mouth Daily., Disp: , Rfl:     SEMAGLUTIDE, 1 MG/DOSE, SC, Inject 1 mg under the skin into the appropriate area as directed 1 (One) Time Per Week., Disp: , Rfl:      Immunizations:  Immunization History   Administered Date(s) Administered    COVID-19 (PFIZER) Purple Cap Monovalent 03/29/2021, 04/19/2021    DTP / HiB 1997, 1997, 1997    DTaP, Unspecified 04/09/1998, 08/31/2001    HPV Quadrivalent 07/08/2008    Hep B, Adolescent or Pediatric 1997, 1997,  1997    HiB 1998    IPV 2001    MMR 1998, 2001    Meningococcal MCV4P (Menactra) 2008, 2015    OPV 1997, 1997, 1997, 1997    Tdap 2008, 2021    Varicella 1998        Surgical History:  Past Surgical History:   Procedure Laterality Date    INJECTION OF MEDICATION  2015    Depo Medrol (Methylprednisone) (2)       INJECTION OF MEDICATION  2016    INFLUENZA IMMUN ADMIN OR PREV RECV'D  (1)       INJECTION OF MEDICATION  2016    Kenalog (1)       TONSILLECTOMY          Family History:  Family History   Problem Relation Age of Onset    Mental illness Mother     Diabetes Maternal Aunt         Type 1 -     Diabetes Paternal Uncle         Type 1 -     Arthritis Maternal Grandmother     Diabetes Other     Ovarian cancer Neg Hx     Breast cancer Neg Hx      Any change in family history since last annual visit: yes / no  Any family history of colon cancer, breast cancer, ovarian cancer, early CAD:       The following portions of the patient's chart were reviewed in this encounter and updated as appropriate: Past Medical History, Surgical History, Family History, and Social History    Over the last 2 weeks, how often have you been bothered by any of the following problems?  Little interest or pleasure in doing things: Several days  Feeling down, depressed, or hopeless: Not at all    Objective      Vitals:    24 0805   BP: 142/84   Pulse: 88   SpO2: 98%               Physical Exam  Vitals reviewed.   Constitutional:       General: She is not in acute distress.     Appearance: Normal appearance. She is not ill-appearing.   Neck:      Comments: No goiter   Cardiovascular:      Rate and Rhythm: Normal rate and regular rhythm.      Pulses: Normal pulses.      Heart sounds: Normal heart sounds. No murmur heard.  Pulmonary:      Effort: Pulmonary effort is normal. No respiratory distress.      Breath sounds:  Normal breath sounds.   Abdominal:      General: There is no distension.      Palpations: Abdomen is soft. There is no mass.      Tenderness: There is no abdominal tenderness. There is no guarding.   Musculoskeletal:      Right lower leg: No edema.      Left lower leg: No edema.   Lymphadenopathy:      Cervical: No cervical adenopathy.   Skin:     General: Skin is warm and dry.   Neurological:      General: No focal deficit present.      Mental Status: She is alert and oriented to person, place, and time.   Psychiatric:         Mood and Affect: Mood normal.         Behavior: Behavior normal.     }     Diagnoses and all orders for this visit:    1. Morbid obesity with BMI of 50.0-59.9, adult (Primary)  -     Lipid Panel; Future  -     Comprehensive Metabolic Panel; Future  -     TSH Rfx On Abnormal To Free T4; Future  -     Hemoglobin A1c; Future    2. Low vitamin D level  -     Vitamin D 25 hydroxy; Future    3. Healthcare maintenance  -     Lipid Panel; Future  -     Comprehensive Metabolic Panel; Future  -     CBC (No Diff); Future  -     TSH Rfx On Abnormal To Free T4; Future    4. Elevated blood pressure reading  -     ECG 12 Lead  -     POC Urinalysis Dipstick, Automated    5. Hives  -     ANCA Panel; Future  -     ETHAN; Future  -     C3 Complement; Future  -     C4 Complement; Future  -     Ambulatory Referral to Dermatology    6. Screening for STD (sexually transmitted disease)  -     Chlamydia trachomatis, Neisseria gonorrhoeae, Trichomonas vaginalis, PCR - Swab, Vagina; Future  -     RPR Qualitative with Reflex to Quant; Future  -     HIV-1 & HIV-2 Antibodies; Future    7. Encounter for immunization  -     Fluzone >6mos ([id number])    8. PCOS (polycystic ovarian syndrome)    9. ASCUS with positive high risk HPV cervical    10. LGSIL on Pap smear of cervix        Assessment & Plan  Hives:  - Differential includes hives or vasculitis  - Persistent since October, pruritic, painful, spreading  - Minimal relief  from hydrocortisone and antihistamines  - Initiate vasculitis workup (ANCA, ETHAN, complement tests) because her lesions last >24 hours and sometimes leave scars   - Refer to dermatology for evaluation/biopsy  - Continue Zyrtec 10 mg daily  - Consider rheumatology referral if positive vasculitis workup    Elevated blood pressure:  - Monitor BP twice weekly, ensure relaxation/proper positioning  - Recommend dietary modifications (reduce salt, avoid processed foods)  - Perform EKG and urine test for proteinuria  - Contact clinic if BP >130/80 for potential antihypertensive therapy  - Consider workup for sleep apnea if she has HTN     Health maintenance:  - Encourage regular physical activity (150 min/week)  - Advise seatbelt use, consider seatbelt cover to help with sensory issues   - Mammograms recommended at age 40, colonoscopies at 45  - Administer influenza vaccine today  - Conduct STD testing (gonorrhea, Chlamydia, trichomonas, syphilis, HIV)  - Order labs for cholesterol, liver/kidney function, electrolytes, blood counts, thyroid function, diabetes, vitamin D    PCOS:  - Ensure menstrual period at least every two months to prevent endometrial hyperplasia/cancer  - Inform clinic/gynecologist if cycles become irregular to consider birth control    Weight management:  - Continue compounded semaglutide 1 mg weekly  - Increase protein intake to 100-150 g/day to prevent muscle loss  - Offered nutrition/exercise counseling, declined today     Follow-up:  - Scheduled in one year or earlier if necessary    History of ASCUS/LSIL   Due to follow up with OBGYN   Advised her to schedule         ECG 12 Lead    Date/Time: 12/19/2024 9:20 AM  Performed by: Lorene Keating MD    Authorized by: Lorene Keating MD  Comparison: not compared with previous ECG   Previous ECG: no previous ECG available  Rhythm: sinus rhythm  Rate: normal  QRS axis: normal    Clinical impression: normal ECG          Today was  a preventative health visit: Patient was counseled on the following:  Lifestyle Changes: Weight Loss, Diet, Exercise  Reproductive Health, contraception, safe sex, healthy relationships  Safety with driving  Work and Life Balance        Mood: PHQ 2 Negative        Patient or patient representative verbalized consent for the use of Ambient Listening during the visit with  Lorene Keating MD for chart documentation. 12/19/2024  09:00 EST        Please note that portions of this document may have been completed with a voice recognition program.      At [facility], we believe that sharing information builds trust and better relationships. You are receiving this note because you are receiving care at [facility] or have recently visited. It is possible you will see health information before a provider has talked with you about it. This kind of information can be easy to misunderstand as it is a medical document. It is intended as [id number]-[id number] communication. It is written in medical language and may contain abbreviations or verbiage that are unfamiliar. It may appear blunt or direct. Medical documents are intended to carry relevant information, facts as evident, and the clinical opinion of the practitioner.  To help you fully understand what it means for your health, we urge you to discuss this note with your provider.

## 2024-12-20 LAB — RPR SER QL: NORMAL

## 2024-12-22 LAB
C TRACH RRNA SPEC QL NAA+PROBE: NEGATIVE
N GONORRHOEA RRNA SPEC QL NAA+PROBE: NEGATIVE
T VAGINALIS RRNA SPEC QL NAA+PROBE: NEGATIVE

## 2024-12-23 LAB
ANA SER QL: NEGATIVE
C-ANCA TITR SER IF: NORMAL TITER
MYELOPEROXIDASE AB SER IA-ACNC: <0.2 UNITS (ref 0–0.9)
P-ANCA ATYPICAL TITR SER IF: NORMAL TITER
P-ANCA TITR SER IF: NORMAL TITER
PROTEINASE3 AB SER IA-ACNC: <0.2 UNITS (ref 0–0.9)

## 2025-04-15 DIAGNOSIS — J45.40 MODERATE PERSISTENT ASTHMA, UNSPECIFIED WHETHER COMPLICATED: ICD-10-CM

## 2025-04-15 NOTE — TELEPHONE ENCOUNTER
Caller: Justice Rey    Relationship: Self    Best call back number: [phone]     Requested Prescriptions:   Requested Prescriptions     Pending Prescriptions Disp Refills    albuterol sulfate  (90 Base) MCG/ACT inhaler 18 g 11     Sig: Inhale 2 puffs Every 4 (Four) Hours As Needed for Wheezing or Shortness of Air.      PATIENT IS NEW WITH PCP AND THE ABOVE MEDICATION PATIENT IS REQUESTING FOR HER TO TAKE OVER AND SEND IN REFILLS . HAS ABOUT 10 DAYS LEFT      Pharmacy where request should be sent: [address] [phone] [facility] [phone]      Last office visit with prescribing clinician: 12/19/2024   Last telemedicine visit with prescribing clinician: Visit date not found   Next office visit with prescribing clinician: 1/5/2026         Does the patient have less than a 3 day supply:  [] Yes  [x] No    Would you like a call back once the refill request has been completed: [] Yes [x] No    If the office needs to give you a call back, can they leave a voicemail: [] Yes [x] No    Rich Garcia Rep   04/15/25 11:32 EDT

## 2025-04-17 RX ORDER — ALBUTEROL SULFATE 90 UG/1
2 INHALANT RESPIRATORY (INHALATION) EVERY 4 HOURS PRN
Qty: 18 G | Refills: 11 | Status: SHIPPED | OUTPATIENT
Start: 2025-04-17

## 2025-06-17 ENCOUNTER — OFFICE VISIT (OUTPATIENT)
Dept: OBSTETRICS AND GYNECOLOGY | Facility: CLINIC | Age: 30
End: 2025-06-17
Payer: COMMERCIAL

## 2025-06-17 VITALS
HEIGHT: 65 IN | SYSTOLIC BLOOD PRESSURE: 118 MMHG | WEIGHT: 293 LBS | DIASTOLIC BLOOD PRESSURE: 88 MMHG | BODY MASS INDEX: 48.82 KG/M2

## 2025-06-17 DIAGNOSIS — Z87.42 HISTORY OF ABNORMAL CERVICAL PAP SMEAR: ICD-10-CM

## 2025-06-17 DIAGNOSIS — E66.01 MORBID OBESITY WITH BMI OF 50.0-59.9, ADULT: ICD-10-CM

## 2025-06-17 DIAGNOSIS — Z01.419 WOMEN'S ANNUAL ROUTINE GYNECOLOGICAL EXAMINATION: Primary | ICD-10-CM

## 2025-06-17 NOTE — PROGRESS NOTES
Gynecologic Annual Exam Note        Gynecologic Exam        Subjective     HPI  Justice eRy is a 30 y.o. No obstetric history on file. female who presents for annual well woman exam as a established patient. There were no changes to her medical or surgical history since her last visit.. Patient's last menstrual period was 06/13/2025 (exact date). Her periods occur every 30-50, lasting 4 days.  The flow is light-moderate. She reports dysmenorrhea is mild occurring first 1-2 days of flow. Marital Status: single.  She is not currently sexually active. STD testing recommendations have been explained to the patient and she does not desire STD testing.    The patient would like to discuss the following complaints today: She wants to ask about prescription of Mounjaro. She states she has a new insurance who will not cover weight management appts.  She took it for a while per her PCP for dx of PCOS and lost 30#.  She stopped it and weight came back.  She wonders if we could Rx.  BMI is over 50.  A1C is normal.    Additional OB/GYN History   contraceptive methods: Abstinence  Desires to: do not start contraception  Thromboembolic Disease: family history  History of migraines: no  Age of menarche: 13    History of STD: no    Last Pap : 10/12/23. Results: ASCUS. HPV: HPV pool +.   Last Completed Pap Smear            Awaiting Completion       PAP SMEAR (Every 3 Years) Order placed this encounter      06/17/2025  Order placed for LIQUID-BASED PAP SMEAR WITH HPV GENOTYPING REGARDLESS OF INTERPRETATION (FREDO,KRISTA,CHANTAL) by Milena Pedersen APRN    10/12/2023  LIQUID-BASED PAP SMEAR WITH HPV GENOTYPING REGARDLESS OF INTERPRETATION (FREOD,KRISTA,CHANTAL)    02/25/2020  Patient-Reported (Performed Externally)                             History of abnormal Pap smear: yes - Colpo-12/22/23  Gardasil status:completed  Family history of uterine, colon, breast, or ovarian cancer: no  Performs monthly Self-Breast Exam: no  Exercises  Regularly:yes  Feelings of Anxiety or Depression: no  Tobacco Usage?: No       Current Outpatient Medications:     albuterol sulfate  (90 Base) MCG/ACT inhaler, Inhale 2 puffs Every 4 (Four) Hours As Needed for Wheezing or Shortness of Air., Disp: 18 g, Rfl: 11    cetirizine (zyrTEC) 10 MG tablet, Take 1 tablet by mouth Daily., Disp: , Rfl:      Patient denies the need for medication refills today.    OB History    No obstetric history on file.         Health Maintenance   Topic Date Due    COVID-19 Vaccine (3 - 2024-25 season) 09/01/2024    Annual Gynecologic Pelvic and Breast Exam  10/13/2024    Pneumococcal Vaccine 0-49 (1 of 2 - PCV) 05/21/2030 (Originally 1/28/2016)    INFLUENZA VACCINE  07/01/2025    ANNUAL PHYSICAL  12/19/2025    PAP SMEAR  10/12/2026    TDAP/TD VACCINES (3 - Td or Tdap) 04/24/2031    HEPATITIS C SCREENING  Completed    CHLAMYDIA SCREENING  Discontinued       Past Medical History:   Diagnosis Date    Allergic     Allergic rhinitis     Ankle pain     ASCUS with positive high risk HPV cervical 12/19/2024    Asthma     IgE-mediated allergic asthma       Body mass index 40.0-44.9, adult     BMI 42.5    Bronchitis     Chronic fatigue, unspecified     Encounter for screening for diabetes mellitus     Hematoma     is traumatic    HPV (human papilloma virus) infection 2023    Insomnia, unspecified     Pain in lower limb     Polycystic ovary syndrome 04/2021    Officially diagnosed October 2021    Rhinitis     Ruptured varicose vein     RLE    Sprain of ankle     Testosterone deficiency April 2021    High levels of testosterone    Upper respiratory infection     Urinary tract infection 11/2021        Past Surgical History:   Procedure Laterality Date    INJECTION OF MEDICATION  05/01/2015    Depo Medrol (Methylprednisone) (2)       INJECTION OF MEDICATION  01/01/2016    INFLUENZA IMMUN ADMIN OR PREV RECV'D  (1)       INJECTION OF MEDICATION  06/28/2016    Kenalog (1)       TONSILLECTOMY   "2001    WISDOM TOOTH EXTRACTION  2024       The additional following portions of the patient's history were reviewed and updated as appropriate: allergies, current medications, past family history, past medical history, past social history, past surgical history, and problem list.    Review of Systems   Constitutional: Negative.    HENT: Negative.     Eyes: Negative.    Respiratory: Negative.     Cardiovascular: Negative.    Gastrointestinal: Negative.    Endocrine: Negative.    Genitourinary: Negative.    Musculoskeletal: Negative.    Skin: Negative.    Allergic/Immunologic: Negative.    Neurological: Negative.    Hematological: Negative.    Psychiatric/Behavioral: Negative.           I have reviewed and agree with the HPI, ROS, and historical information as entered above. Milena Pedersen, APRN          Objective   /88   Ht 165.1 cm (65\")   Wt (!) 145 kg (320 lb 6.4 oz)   LMP 06/13/2025 (Exact Date)   BMI 53.32 kg/m²     Physical Exam  Vitals and nursing note reviewed. Exam conducted with a chaperone present.   Constitutional:       General: She is not in acute distress.     Appearance: Normal appearance. She is well-developed. She is obese. She is not ill-appearing.   Neck:      Thyroid: No thyroid mass or thyromegaly.   Pulmonary:      Effort: Pulmonary effort is normal. No respiratory distress or retractions.   Chest:      Chest wall: No mass.   Breasts:     Right: Normal. No mass, nipple discharge, skin change or tenderness.      Left: Normal. No mass, nipple discharge, skin change or tenderness.   Abdominal:      General: There is no distension.      Palpations: Abdomen is soft. Abdomen is not rigid. There is no mass.      Tenderness: There is no abdominal tenderness. There is no guarding or rebound.      Hernia: No hernia is present. There is no hernia in the left inguinal area.   Genitourinary:     General: Normal vulva.      Labia:         Right: No rash, tenderness or lesion.         Left: No rash, " tenderness or lesion.       Vagina: Normal. No vaginal discharge or lesions.      Cervix: Normal.      Uterus: Normal. Not enlarged, not fixed and not tender.       Adnexa: Right adnexa normal and left adnexa normal.        Right: No mass or tenderness.          Left: No mass or tenderness.        Rectum: No external hemorrhoid.   Musculoskeletal:      Cervical back: No muscular tenderness.   Skin:     General: Skin is warm and dry.   Neurological:      Mental Status: She is alert and oriented to person, place, and time.   Psychiatric:         Mood and Affect: Mood normal.         Behavior: Behavior normal.            Assessment and Plan    Problem List Items Addressed This Visit          Endocrine and Metabolic    Morbid obesity with BMI of 50.0-59.9, adult     Other Visit Diagnoses         Women's annual routine gynecological examination    -  Primary    Relevant Orders    LIQUID-BASED PAP SMEAR WITH HPV GENOTYPING REGARDLESS OF INTERPRETATION (FREDO,COR,MAD)      History of abnormal cervical Pap smear        Relevant Orders    LIQUID-BASED PAP SMEAR WITH HPV GENOTYPING REGARDLESS OF INTERPRETATION (FREDO,COR,MAD)            GYN annual well woman exam.   Reviewed pap guidelines.   Encouraged use of condoms for STD prevention.  Reviewed monthly self breast exams.  Instructed to call with lumps, pain, or breast discharge.    Reviewed BMI and weight loss as preventative health measures.   Reviewed exercise as a preventative health measures.   Reccommended Flu Vaccine in Fall of each year.  RTC in 1 year or PRN with problems  Return in about 1 year (around 6/17/2026) for Annual physical.  Will find out more about Rx    Milena Pedersen, APRN  06/17/2025

## 2025-06-18 LAB — REF LAB TEST METHOD: NORMAL

## 2025-06-19 ENCOUNTER — TELEPHONE (OUTPATIENT)
Dept: OBSTETRICS AND GYNECOLOGY | Facility: CLINIC | Age: 30
End: 2025-06-19
Payer: COMMERCIAL

## 2025-06-19 DIAGNOSIS — E66.01 MORBID OBESITY WITH BMI OF 50.0-59.9, ADULT: Primary | ICD-10-CM

## 2025-06-19 DIAGNOSIS — E28.2 PCOS (POLYCYSTIC OVARIAN SYNDROME): ICD-10-CM

## 2025-06-19 NOTE — TELEPHONE ENCOUNTER
Per Milena Pedersen this pt was on Monjuaro per PCP for PCOS. Her insurance won't cover weight management appts. Her BMI is over 50. Her A1C is normal.  Please call and get details from her and I'll send it in     [facility].    She was on Ozempic through 2022 but she wants to try Mounjaro.   Pharmacy is [facility] [address]

## 2025-06-24 ENCOUNTER — TELEPHONE (OUTPATIENT)
Dept: OBSTETRICS AND GYNECOLOGY | Facility: CLINIC | Age: 30
End: 2025-06-24
Payer: COMMERCIAL

## 2025-06-24 NOTE — TELEPHONE ENCOUNTER
I called the pharmacy and it should be Mounjaro starter dose. She took the verbal and will send the PA later.    She wants to ask about prescription of Mounjaro. She states she has a new insurance who will not cover weight management appts.  She took it for a while per her PCP for dx of PCOS and lost 30#.  She stopped it and weight came back.  She wonders if we could Rx.  BMI is over 50.  A1C is normal.

## 2025-06-24 NOTE — TELEPHONE ENCOUNTER
[place] ([facility] pharmacy) called in requesting clarity on medication:     Tirzepatide 2.5 MG/0.5ML solution auto-injector (06/24/2025)     Should medication include  Zepbound or Menjaro

## 2025-06-26 ENCOUNTER — TELEPHONE (OUTPATIENT)
Dept: OBSTETRICS AND GYNECOLOGY | Facility: CLINIC | Age: 30
End: 2025-06-26
Payer: COMMERCIAL

## 2025-06-26 NOTE — TELEPHONE ENCOUNTER
(Key: [id number])  Mounjaro 2.5MG/0.5ML auto-injectors Form  [employer]  Pending  Update: PA Case: [id number], Status: Denied. Notification: Completed. We denied your request because we did not see what we need to approve the drug you  asked for, (Mounjaro). We may be able to approve this drug for this illness (type 2  diabetes mellitus. [facility]